# Patient Record
Sex: FEMALE | Race: WHITE | HISPANIC OR LATINO | Employment: OTHER | URBAN - METROPOLITAN AREA
[De-identification: names, ages, dates, MRNs, and addresses within clinical notes are randomized per-mention and may not be internally consistent; named-entity substitution may affect disease eponyms.]

---

## 2017-07-17 ENCOUNTER — APPOINTMENT (OUTPATIENT)
Dept: RADIOLOGY | Facility: MEDICAL CENTER | Age: 72
DRG: 292 | End: 2017-07-17
Attending: EMERGENCY MEDICINE
Payer: MEDICARE

## 2017-07-17 ENCOUNTER — HOSPITAL ENCOUNTER (INPATIENT)
Facility: MEDICAL CENTER | Age: 72
LOS: 3 days | DRG: 292 | End: 2017-07-20
Attending: EMERGENCY MEDICINE | Admitting: INTERNAL MEDICINE
Payer: MEDICARE

## 2017-07-17 ENCOUNTER — RESOLUTE PROFESSIONAL BILLING HOSPITAL PROF FEE (OUTPATIENT)
Dept: HOSPITALIST | Facility: MEDICAL CENTER | Age: 72
End: 2017-07-17
Payer: MEDICARE

## 2017-07-17 DIAGNOSIS — R07.9 CHEST PAIN, UNSPECIFIED TYPE: ICD-10-CM

## 2017-07-17 DIAGNOSIS — I50.31 ACUTE DIASTOLIC CONGESTIVE HEART FAILURE (HCC): ICD-10-CM

## 2017-07-17 PROBLEM — E78.5 DYSLIPIDEMIA: Status: ACTIVE | Noted: 2017-07-17

## 2017-07-17 LAB
ALBUMIN SERPL BCP-MCNC: 3.5 G/DL (ref 3.2–4.9)
ALBUMIN/GLOB SERPL: 1.1 G/DL
ALP SERPL-CCNC: 121 U/L (ref 30–99)
ALT SERPL-CCNC: 14 U/L (ref 2–50)
ANION GAP SERPL CALC-SCNC: 10 MMOL/L (ref 0–11.9)
APTT PPP: 26.9 SEC (ref 24.7–36)
AST SERPL-CCNC: 17 U/L (ref 12–45)
BASOPHILS # BLD AUTO: 0.4 % (ref 0–1.8)
BASOPHILS # BLD: 0.04 K/UL (ref 0–0.12)
BILIRUB SERPL-MCNC: 0.4 MG/DL (ref 0.1–1.5)
BNP SERPL-MCNC: 159 PG/ML (ref 0–100)
BUN SERPL-MCNC: 39 MG/DL (ref 8–22)
CALCIUM SERPL-MCNC: 8.3 MG/DL (ref 8.5–10.5)
CHLORIDE SERPL-SCNC: 98 MMOL/L (ref 96–112)
CO2 SERPL-SCNC: 22 MMOL/L (ref 20–33)
CREAT SERPL-MCNC: 1.51 MG/DL (ref 0.5–1.4)
EKG IMPRESSION: NORMAL
EOSINOPHIL # BLD AUTO: 0.08 K/UL (ref 0–0.51)
EOSINOPHIL NFR BLD: 0.8 % (ref 0–6.9)
ERYTHROCYTE [DISTWIDTH] IN BLOOD BY AUTOMATED COUNT: 37.5 FL (ref 35.9–50)
GFR SERPL CREATININE-BSD FRML MDRD: 34 ML/MIN/1.73 M 2
GLOBULIN SER CALC-MCNC: 3.2 G/DL (ref 1.9–3.5)
GLUCOSE BLD-MCNC: 472 MG/DL (ref 65–99)
GLUCOSE SERPL-MCNC: 313 MG/DL (ref 65–99)
HCT VFR BLD AUTO: 32.7 % (ref 37–47)
HGB BLD-MCNC: 10.7 G/DL (ref 12–16)
IMM GRANULOCYTES # BLD AUTO: 0.03 K/UL (ref 0–0.11)
IMM GRANULOCYTES NFR BLD AUTO: 0.3 % (ref 0–0.9)
INR PPP: 0.93 (ref 0.87–1.13)
LYMPHOCYTES # BLD AUTO: 1.99 K/UL (ref 1–4.8)
LYMPHOCYTES NFR BLD: 19.5 % (ref 22–41)
MCH RBC QN AUTO: 28.1 PG (ref 27–33)
MCHC RBC AUTO-ENTMCNC: 32.7 G/DL (ref 33.6–35)
MCV RBC AUTO: 85.8 FL (ref 81.4–97.8)
MONOCYTES # BLD AUTO: 0.81 K/UL (ref 0–0.85)
MONOCYTES NFR BLD AUTO: 7.9 % (ref 0–13.4)
NEUTROPHILS # BLD AUTO: 7.26 K/UL (ref 2–7.15)
NEUTROPHILS NFR BLD: 71.1 % (ref 44–72)
NRBC # BLD AUTO: 0 K/UL
NRBC BLD AUTO-RTO: 0 /100 WBC
PLATELET # BLD AUTO: 218 K/UL (ref 164–446)
PMV BLD AUTO: 12.9 FL (ref 9–12.9)
POTASSIUM SERPL-SCNC: 3.9 MMOL/L (ref 3.6–5.5)
PROT SERPL-MCNC: 6.7 G/DL (ref 6–8.2)
PROTHROMBIN TIME: 12.7 SEC (ref 12–14.6)
RBC # BLD AUTO: 3.81 M/UL (ref 4.2–5.4)
SODIUM SERPL-SCNC: 130 MMOL/L (ref 135–145)
TROPONIN I SERPL-MCNC: 0.04 NG/ML (ref 0–0.04)
WBC # BLD AUTO: 10.2 K/UL (ref 4.8–10.8)

## 2017-07-17 PROCEDURE — 71010 DX-CHEST-PORTABLE (1 VIEW): CPT

## 2017-07-17 PROCEDURE — 93005 ELECTROCARDIOGRAM TRACING: CPT

## 2017-07-17 PROCEDURE — 96374 THER/PROPH/DIAG INJ IV PUSH: CPT

## 2017-07-17 PROCEDURE — 700102 HCHG RX REV CODE 250 W/ 637 OVERRIDE(OP): Performed by: INTERNAL MEDICINE

## 2017-07-17 PROCEDURE — A9270 NON-COVERED ITEM OR SERVICE: HCPCS | Performed by: INTERNAL MEDICINE

## 2017-07-17 PROCEDURE — 96376 TX/PRO/DX INJ SAME DRUG ADON: CPT

## 2017-07-17 PROCEDURE — 85730 THROMBOPLASTIN TIME PARTIAL: CPT

## 2017-07-17 PROCEDURE — 94760 N-INVAS EAR/PLS OXIMETRY 1: CPT

## 2017-07-17 PROCEDURE — A9270 NON-COVERED ITEM OR SERVICE: HCPCS | Performed by: NURSE PRACTITIONER

## 2017-07-17 PROCEDURE — 85025 COMPLETE CBC W/AUTO DIFF WBC: CPT

## 2017-07-17 PROCEDURE — 99223 1ST HOSP IP/OBS HIGH 75: CPT | Performed by: INTERNAL MEDICINE

## 2017-07-17 PROCEDURE — 80053 COMPREHEN METABOLIC PANEL: CPT

## 2017-07-17 PROCEDURE — 700111 HCHG RX REV CODE 636 W/ 250 OVERRIDE (IP): Performed by: EMERGENCY MEDICINE

## 2017-07-17 PROCEDURE — 770020 HCHG ROOM/CARE - TELE (206)

## 2017-07-17 PROCEDURE — 96372 THER/PROPH/DIAG INJ SC/IM: CPT

## 2017-07-17 PROCEDURE — 82962 GLUCOSE BLOOD TEST: CPT

## 2017-07-17 PROCEDURE — 36415 COLL VENOUS BLD VENIPUNCTURE: CPT

## 2017-07-17 PROCEDURE — 99285 EMERGENCY DEPT VISIT HI MDM: CPT

## 2017-07-17 PROCEDURE — 84484 ASSAY OF TROPONIN QUANT: CPT

## 2017-07-17 PROCEDURE — 83880 ASSAY OF NATRIURETIC PEPTIDE: CPT

## 2017-07-17 PROCEDURE — 700102 HCHG RX REV CODE 250 W/ 637 OVERRIDE(OP): Performed by: NURSE PRACTITIONER

## 2017-07-17 PROCEDURE — 700111 HCHG RX REV CODE 636 W/ 250 OVERRIDE (IP): Performed by: INTERNAL MEDICINE

## 2017-07-17 PROCEDURE — 85610 PROTHROMBIN TIME: CPT

## 2017-07-17 RX ORDER — DEXTROSE MONOHYDRATE 25 G/50ML
25 INJECTION, SOLUTION INTRAVENOUS
Status: DISCONTINUED | OUTPATIENT
Start: 2017-07-17 | End: 2017-07-20 | Stop reason: HOSPADM

## 2017-07-17 RX ORDER — HEPARIN SODIUM 5000 [USP'U]/ML
5000 INJECTION, SOLUTION INTRAVENOUS; SUBCUTANEOUS EVERY 8 HOURS
Status: DISCONTINUED | OUTPATIENT
Start: 2017-07-17 | End: 2017-07-20 | Stop reason: HOSPADM

## 2017-07-17 RX ORDER — INSULIN GLARGINE 100 [IU]/ML
30 INJECTION, SOLUTION SUBCUTANEOUS NIGHTLY
Status: DISCONTINUED | OUTPATIENT
Start: 2017-07-17 | End: 2017-07-20 | Stop reason: HOSPADM

## 2017-07-17 RX ORDER — FUROSEMIDE 10 MG/ML
40 INJECTION INTRAMUSCULAR; INTRAVENOUS ONCE
Status: COMPLETED | OUTPATIENT
Start: 2017-07-17 | End: 2017-07-17

## 2017-07-17 RX ORDER — DOXYCYCLINE 100 MG/1
100 TABLET ORAL EVERY 12 HOURS
Status: DISCONTINUED | OUTPATIENT
Start: 2017-07-17 | End: 2017-07-20 | Stop reason: HOSPADM

## 2017-07-17 RX ORDER — OMEPRAZOLE 20 MG/1
20 CAPSULE, DELAYED RELEASE ORAL DAILY
Status: DISCONTINUED | OUTPATIENT
Start: 2017-07-18 | End: 2017-07-20 | Stop reason: HOSPADM

## 2017-07-17 RX ORDER — GUAIFENESIN 600 MG/1
600 TABLET, EXTENDED RELEASE ORAL EVERY 12 HOURS
Status: DISCONTINUED | OUTPATIENT
Start: 2017-07-17 | End: 2017-07-20 | Stop reason: HOSPADM

## 2017-07-17 RX ORDER — FUROSEMIDE 10 MG/ML
40 INJECTION INTRAMUSCULAR; INTRAVENOUS
Status: DISCONTINUED | OUTPATIENT
Start: 2017-07-17 | End: 2017-07-18

## 2017-07-17 RX ORDER — LABETALOL HYDROCHLORIDE 5 MG/ML
10 INJECTION, SOLUTION INTRAVENOUS EVERY 4 HOURS PRN
Status: DISCONTINUED | OUTPATIENT
Start: 2017-07-17 | End: 2017-07-20 | Stop reason: HOSPADM

## 2017-07-17 RX ORDER — POLYETHYLENE GLYCOL 3350 17 G/17G
1 POWDER, FOR SOLUTION ORAL
Status: DISCONTINUED | OUTPATIENT
Start: 2017-07-17 | End: 2017-07-20 | Stop reason: HOSPADM

## 2017-07-17 RX ORDER — SILDENAFIL CITRATE 20 MG/1
20 TABLET ORAL 2 TIMES DAILY
COMMUNITY

## 2017-07-17 RX ORDER — ACETAMINOPHEN 325 MG/1
650 TABLET ORAL EVERY 4 HOURS PRN
Status: DISCONTINUED | OUTPATIENT
Start: 2017-07-17 | End: 2017-07-20 | Stop reason: HOSPADM

## 2017-07-17 RX ORDER — ATORVASTATIN CALCIUM 40 MG/1
40 TABLET, FILM COATED ORAL NIGHTLY
Status: DISCONTINUED | OUTPATIENT
Start: 2017-07-17 | End: 2017-07-20 | Stop reason: HOSPADM

## 2017-07-17 RX ORDER — LISINOPRIL 40 MG/1
40 TABLET ORAL DAILY
COMMUNITY
End: 2017-07-17

## 2017-07-17 RX ORDER — AMOXICILLIN 250 MG
2 CAPSULE ORAL 2 TIMES DAILY
Status: DISCONTINUED | OUTPATIENT
Start: 2017-07-18 | End: 2017-07-20 | Stop reason: HOSPADM

## 2017-07-17 RX ORDER — ACETAMINOPHEN 650 MG/1
650 SUPPOSITORY RECTAL EVERY 4 HOURS PRN
Status: DISCONTINUED | OUTPATIENT
Start: 2017-07-17 | End: 2017-07-20 | Stop reason: HOSPADM

## 2017-07-17 RX ORDER — POTASSIUM CHLORIDE 20 MEQ/1
10 TABLET, EXTENDED RELEASE ORAL 2 TIMES DAILY
Status: DISCONTINUED | OUTPATIENT
Start: 2017-07-17 | End: 2017-07-19

## 2017-07-17 RX ORDER — ONDANSETRON 4 MG/1
4 TABLET, ORALLY DISINTEGRATING ORAL EVERY 4 HOURS PRN
Status: DISCONTINUED | OUTPATIENT
Start: 2017-07-17 | End: 2017-07-20 | Stop reason: HOSPADM

## 2017-07-17 RX ORDER — SILDENAFIL CITRATE 20 MG/1
20 TABLET ORAL 2 TIMES DAILY
Status: DISCONTINUED | OUTPATIENT
Start: 2017-07-17 | End: 2017-07-20 | Stop reason: HOSPADM

## 2017-07-17 RX ORDER — AMLODIPINE BESYLATE 5 MG/1
5 TABLET ORAL DAILY
Status: DISCONTINUED | OUTPATIENT
Start: 2017-07-18 | End: 2017-07-20 | Stop reason: HOSPADM

## 2017-07-17 RX ORDER — BISACODYL 10 MG
10 SUPPOSITORY, RECTAL RECTAL
Status: DISCONTINUED | OUTPATIENT
Start: 2017-07-17 | End: 2017-07-20 | Stop reason: HOSPADM

## 2017-07-17 RX ORDER — ONDANSETRON 2 MG/ML
4 INJECTION INTRAMUSCULAR; INTRAVENOUS EVERY 4 HOURS PRN
Status: DISCONTINUED | OUTPATIENT
Start: 2017-07-17 | End: 2017-07-20 | Stop reason: HOSPADM

## 2017-07-17 RX ORDER — AMLODIPINE BESYLATE 5 MG/1
5 TABLET ORAL DAILY
COMMUNITY

## 2017-07-17 RX ADMIN — INSULIN GLARGINE 30 UNITS: 100 INJECTION, SOLUTION SUBCUTANEOUS at 20:34

## 2017-07-17 RX ADMIN — HEPARIN SODIUM 5000 UNITS: 5000 INJECTION, SOLUTION INTRAVENOUS; SUBCUTANEOUS at 22:22

## 2017-07-17 RX ADMIN — SILDENAFIL 20 MG: 20 TABLET ORAL at 22:22

## 2017-07-17 RX ADMIN — DOXYCYCLINE 100 MG: 100 TABLET ORAL at 22:22

## 2017-07-17 RX ADMIN — INSULIN LISPRO 9 UNITS: 100 INJECTION, SOLUTION INTRAVENOUS; SUBCUTANEOUS at 20:33

## 2017-07-17 RX ADMIN — GUAIFENESIN 600 MG: 600 TABLET, EXTENDED RELEASE ORAL at 22:22

## 2017-07-17 RX ADMIN — FUROSEMIDE 40 MG: 10 INJECTION, SOLUTION INTRAMUSCULAR; INTRAVENOUS at 19:31

## 2017-07-17 RX ADMIN — POTASSIUM CHLORIDE 10 MEQ: 1500 TABLET, EXTENDED RELEASE ORAL at 22:22

## 2017-07-17 RX ADMIN — FUROSEMIDE 40 MG: 10 INJECTION, SOLUTION INTRAMUSCULAR; INTRAVENOUS at 13:41

## 2017-07-17 RX ADMIN — ACETAMINOPHEN 650 MG: 325 TABLET, FILM COATED ORAL at 22:22

## 2017-07-17 RX ADMIN — ATORVASTATIN CALCIUM 40 MG: 40 TABLET, FILM COATED ORAL at 22:22

## 2017-07-17 ASSESSMENT — ENCOUNTER SYMPTOMS
DEPRESSION: 0
MUSCULOSKELETAL NEGATIVE: 1
HEADACHES: 0
SHORTNESS OF BREATH: 1
GASTROINTESTINAL NEGATIVE: 1
FEVER: 0
COUGH: 1
MYALGIAS: 0
WEAKNESS: 0
PSYCHIATRIC NEGATIVE: 1
NEUROLOGICAL NEGATIVE: 1
EYES NEGATIVE: 1
SPUTUM PRODUCTION: 0
BRUISES/BLEEDS EASILY: 0
DIZZINESS: 0
WHEEZING: 0
BLURRED VISION: 0
ABDOMINAL PAIN: 0

## 2017-07-17 ASSESSMENT — COGNITIVE AND FUNCTIONAL STATUS - GENERAL
WALKING IN HOSPITAL ROOM: A LITTLE
SUGGESTED CMS G CODE MODIFIER DAILY ACTIVITY: CJ
DRESSING REGULAR LOWER BODY CLOTHING: A LITTLE
HELP NEEDED FOR BATHING: A LITTLE
SUGGESTED CMS G CODE MODIFIER MOBILITY: CJ
CLIMB 3 TO 5 STEPS WITH RAILING: A LITTLE
MOBILITY SCORE: 22
DAILY ACTIVITIY SCORE: 22

## 2017-07-17 ASSESSMENT — LIFESTYLE VARIABLES
DO YOU DRINK ALCOHOL: NO
EVER_SMOKED: YES

## 2017-07-17 ASSESSMENT — PAIN SCALES - GENERAL
PAINLEVEL_OUTOF10: 8
PAINLEVEL_OUTOF10: 0
PAINLEVEL_OUTOF10: 0
PAINLEVEL_OUTOF10: 2

## 2017-07-17 ASSESSMENT — PATIENT HEALTH QUESTIONNAIRE - PHQ9
SUM OF ALL RESPONSES TO PHQ QUESTIONS 1-9: 0
2. FEELING DOWN, DEPRESSED, IRRITABLE, OR HOPELESS: NOT AT ALL
SUM OF ALL RESPONSES TO PHQ9 QUESTIONS 1 AND 2: 0
1. LITTLE INTEREST OR PLEASURE IN DOING THINGS: NOT AT ALL

## 2017-07-17 NOTE — IP AVS SNAPSHOT
" <p align=\"LEFT\"><IMG SRC=\"//[email or web address]$/[email or web address]\" alt=\"Image\" WIDTH=\"50%\" HEIGHT=\"200\" BORDER=\"\"></p>                   Name:Joanne Yepez  Medical Record Number:[id number]  CSN: [id number]    YOB: 1945   Age: 72 y.o.  Sex: female  HT:1.549 m (5' 1\") WT: 75.2 kg (165 lb 12.6 oz)          Admit Date: 7/17/2017     Discharge Date:   Today's Date: 7/20/2017  Attending Doctor:  Myron Singh M.D.                  Allergies:  Review of patient's allergies indicates no known allergies.          Your appointments     Jul 21, 2017 10:00 AM   CARE MANAGER TELEPHONE VISIT with CARE MANAGER   [address]  [place] [address] [phone]           ***IMPORTANT**** This is a phone visit only. Do not come into the office. The Care Manager will call you at the scheduled time for Care Manager Telephone Visit.            Jul 24, 2017  2:20 PM   Established Patient with FADY Yepez   [address]  [place] [address] [phone]           You will be receiving a confirmation call a few days before your appointment from our automated call confirmation system.            Aug 15, 2017 10:15 AM   Heart Failure New with Huong Jones M.D.   [facility] (--)    [address], [address]  [place] [id number] [phone]                 Medication List      Take these Medications        Instructions    acetaminophen 325 MG Tabs   Commonly known as:  TYLENOL    Take 2 Tabs by mouth every four hours as needed for Fever (Temperature greater than 101.5 F).   Dose:  650 mg       amlodipine 5 MG Tabs   Commonly known as:  NORVASC    Take 5 mg by mouth every day.   Dose:  5 mg       aspirin EC 81 MG Tbec   Commonly known as:  ECOTRIN    Take 81 mg by mouth every day.   Dose:  81 mg       atorvastatin 40 MG Tabs   Commonly known as:  LIPITOR    Take 40 mg " by mouth every evening.   Dose:  40 mg       doxycycline monohydrate 100 MG tablet   Commonly known as:  ADOXA    Take 1 Tab by mouth every 12 hours for 1 day.   Dose:  100 mg       furosemide 40 MG Tabs   Commonly known as:  LASIX    Take 1 Tab by mouth every day.   Dose:  40 mg       * guaiFENesin 100 MG/5ML Soln   Commonly known as:  ROBITUSSIN    Take 10 mL by mouth every four hours as needed for Cough.   Dose:  10 mL       * guaifenesin  MG Tb12   Commonly known as:  MUCINEX    Take 1 Tab by mouth every 12 hours.   Dose:  600 mg       insulin detemir 100 UNIT/ML Soln   What changed:  when to take this   Commonly known as:  LEVEMIR    Inject 30 Units as instructed every evening.   Dose:  30 Units       insulin lispro 100 UNIT/ML Soln   Commonly known as:  HUMALOG    Inject 5 Units as instructed 3 times a day before meals.   Dose:  5 Units       metoprolol 25 MG Tabs   Commonly known as:  LOPRESSOR    Take 25 mg by mouth 2 times a day.   Dose:  25 mg       omeprazole 20 MG delayed-release capsule   Commonly known as:  PRILOSEC    Take 1 Cap by mouth every day.   Dose:  20 mg       potassium chloride SA 10 MEQ Tbcr   Commonly known as:  K-DUR    Take 1 Tab by mouth every day.   Dose:  10 mEq       senna-docusate 8.6-50 MG Tabs   Commonly known as:  PERICOLACE or SENOKOT S    Take 2 Tabs by mouth 2 Times a Day.   Dose:  2 Tab       sildenafil 20 MG tablet   Commonly known as:  REVATIO    Take 20 mg by mouth 2 Times a Day.   Dose:  20 mg       * Notice:  This list has 2 medication(s) that are the same as other medications prescribed for you. Read the directions carefully, and ask your doctor or other care provider to review them with you.

## 2017-07-17 NOTE — ED NOTES
Med rec complete per Pt's Rx bottles at bedside  Rx bottles returned to Pt's purse  Allergies reviewed

## 2017-07-17 NOTE — ED NOTES
Chief Complaint   Patient presents with   • Shortness of Breath     BIB EMS for SOB with exertion x 2 days. BLE pitting edema noted.      Pt Polish speaking only.   Per EMS pt was 88% with exertion. Resting on RA in bed at 96%.  Chart up for ERP.

## 2017-07-17 NOTE — IP AVS SNAPSHOT
7/20/2017    Joanne Yepez  [address] [address]    Dear Joanne:    [facility] wants to ensure your discharge home is safe and you or your loved ones have had all of your questions answered regarding your care after you leave the hospital.    Below is a list of resources and contact information should you have any questions regarding your hospital stay, follow-up instructions, or active medical symptoms.    Questions or Concerns Regarding… Contact   Medical Questions Related to Your Discharge  (7 days a week, 8am-5pm) Contact a Nurse Care Coordinator   [phone]   Medical Questions Not Related to Your Discharge  (24 hours a day / 7 days a week)  Contact the Nurse Health Line   [phone]    Medications or Discharge Instructions Refer to your discharge packet   or contact your [facility] Primary Care Provider   [phone]   Follow-up Appointment(s) Schedule your appointment via Qik   or contact Scheduling [phone]   Billing Review your statement via Qik  or contact Billing [phone]   Medical Records Review your records via Qik   or contact Medical Records [phone]     You may receive a telephone call within two days of discharge. This call is to make certain you understand your discharge instructions and have the opportunity to have any questions answered. You can also easily access your medical information, test results and upcoming appointments via the Qik free online health management tool. You can learn more and sign up at ZenDay/Qik. For assistance setting up your Qik account, please call [phone].    Once again, we want to ensure your discharge home is safe and that you have a clear understanding of any next steps in your care. If you have any questions or concerns, please do not hesitate to contact us, we are here for you. Thank you for choosing [facility] for your healthcare needs.    Sincerely,    Your [facility]  Team

## 2017-07-17 NOTE — IP AVS SNAPSHOT
" Home Care Instructions                                                                                                                  Name:Joanne Yepez  Medical Record Number:[id number]  CSN: [id number]    YOB: 1945   Age: 72 y.o.  Sex: female  HT:1.549 m (5' 1\") WT: 75.2 kg (165 lb 12.6 oz)          Admit Date: 7/17/2017     Discharge Date:   Today's Date: 7/20/2017  Attending Doctor:  Myron Singh M.D.                  Allergies:  Review of patient's allergies indicates no known allergies.            Discharge Instructions       Discharge Instructions    Discharged to home by car with relative. Discharged via wheelchair, hospital escort: Yes.  Special equipment needed: Not Applicable    Be sure to schedule a follow-up appointment with your primary care doctor or any specialists as instructed.     Discharge Plan:   Influenza Vaccine Indication: Patient Refuses (outside flu season)    I understand that a diet low in cholesterol, fat, and sodium is recommended for good health. Unless I have been given specific instructions below for another diet, I accept this instruction as my diet prescription.   Other diet: Renal/Diabetic    Special Instructions: Follow up with her primary provider at [place] in 2 weeks. Defer further workup for chronic R sided axillary pain.  Follow up with Heart Failure appointment July 24th        HF Patient Discharge Instructions  · Monitor your weight daily, and maintain a weight chart, to track your weight changes.   · Activity as tolerated, unless your Doctor has ordered otherwise. Other activity order: as tolerated.  · Follow a low fat, low cholesterol, low salt diet unless instructed otherwise by your Doctor. Read the labels on the back of food products and track your intake of fat, cholesterol and salt.   · Fluid Restriction Yes. If a Fluid Restriction has been ordered by your Doctor, measure fluids with a measuring cup to ensure that you are not " exceeding the restriction.   · No smoking.  · Oxygen No. If your Doctor has ordered that you wear Oxygen at home, it is important to wear it as ordered.  · Did you receive an explanation from staff on the importance of taking each of your medications and why it is necessary to keep taking them unless your doctor says to stop? Yes  · Were all of your questions answered about how to manage your heart failure and what to do if you have increased signs and symptoms after you go home? Yes  · Do you feel like your heart failure care team involved you in the care treatment plan and allowed you to make decisions regarding your care while in the hospital and addressed any discharge needs you might have? Yes    See the educational handout provided at discharge for more information on monitoring your daily weight, activity and diet. This also explains more about Heart Failure, symptoms of a flare-up and some of the tests that you have undergone.     Warning Signs of a Flare-Up include:  · Swelling in the ankles or lower legs.  · Shortness of breath, while at rest, or while doing normal activities.   · Shortness of breath at night when in bed, or coughing in bed.   · Requiring more pillows to sleep at night, or needing to sit up at night to sleep.  · Feeling weak, dizzy or fatigued.     When to call your Doctor:  · Call [facility] seven days a week from 8:00 a.m. to 8:00 p.m. for medical questions [phone].  · Call your Primary Care Physician or Cardiologist if:   1. You experience any pain radiating to your jaw or neck.  2. You have any difficulty breathing.  3. You experience weight gain of 3 lbs in a day or 5 lbs in a week.   4. You feel any palpitations or irregular heartbeats.  5. You become dizzy or lose consciousness.   If you have had an angiogram or had a pacemaker or AICD placed, and experience:  1. Bleeding, drainage or swelling at the surgical / puncture site.  2. Fever greater than 100.0  F  3. Shock from internal defibrillator.  4. Cool and / or numb extremities.      · Is patient discharged on Warfarin / Coumadin?   No     · Is patient Post Blood Transfusion?  No    Depression / Suicide Risk    As you are discharged from this [facility], it is important to learn how to keep safe from harming yourself.    Recognize the warning signs:  · Abrupt changes in personality, positive or negative- including increase in energy   · Giving away possessions  · Change in eating patterns- significant weight changes-  positive or negative  · Change in sleeping patterns- unable to sleep or sleeping all the time   · Unwillingness or inability to communicate  · Depression  · Unusual sadness, discouragement and loneliness  · Talk of wanting to die  · Neglect of personal appearance   · Rebelliousness- reckless behavior  · Withdrawal from people/activities they love  · Confusion- inability to concentrate     If you or a loved one observes any of these behaviors or has concerns about self-harm, here's what you can do:  · Talk about it- your feelings and reasons for harming yourself  · Remove any means that you might use to hurt yourself (examples: pills, rope, extension cords, firearm)  · Get professional help from the community (Mental Health, Substance Abuse, psychological counseling)  · Do not be alone:Call your Safe Contact- someone whom you trust who will be there for you.  · Call your local CRISIS HOTLINE 521-2804 or 833-474-8955  · Call your local Children's Mobile Crisis Response Team [place] (587) 032-7963 or www.FiveStars  · Call the toll free National Suicide Prevention Hotlines   · National Suicide Prevention Lifeline 106-853-BSCR (3356)  · National Hope Line Network 800-SUICIDE (405-4993)        Your appointments     Jul 21, 2017 10:00 AM   CARE MANAGER TELEPHONE VISIT with CARE MANAGER   [facility] ([facility] 2[facility]    [address] [place] [address] [address]  [place] [address] [phone]            ***IMPORTANT**** This is a phone visit only. Do not come into the office. The Care Manager will call you at the scheduled time for Care Manager Telephone Visit.            Jul 24, 2017  2:20 PM   Established Patient with FADY Yepez   [facility]    [address]  [place] [address]   [phone]           You will be receiving a confirmation call a few days before your appointment from our automated call confirmation system.            Aug 15, 2017 10:15 AM   Heart Failure New with Huong Jones M.D.   [facility] (--)    [address], [address]  [place] [id number] [phone]                 Discharge Medication Instructions:    Below are the medications your physician expects you to take upon discharge:    Review all your home medications and newly ordered medications with your doctor and/or pharmacist. Follow medication instructions as directed by your doctor and/or pharmacist.    Please keep your medication list with you and share with your physician.               Medication List      START taking these medications        Instructions    Morning Afternoon Evening Bedtime    acetaminophen 325 MG Tabs   Last time this was given:  650 mg on 7/19/2017  9:43 PM   Commonly known as:  TYLENOL   Next Dose Due:  As needed        Take 2 Tabs by mouth every four hours as needed for Fever (Temperature greater than 101.5 F).   Dose:  650 mg                        doxycycline monohydrate 100 MG tablet   Last time this was given:  100 mg on 7/20/2017  9:00 AM   Commonly known as:  ADOXA   Next Dose Due:  9 PM        Take 1 Tab by mouth every 12 hours for 1 day.   Dose:  100 mg                        * guaiFENesin 100 MG/5ML Soln   Commonly known as:  ROBITUSSIN   Next Dose Due:  As needed        Take 10 mL by mouth every four hours as needed for Cough.   Dose:  10 mL                        * guaifenesin  MG Tb12    Last time this was given:  600 mg on 7/20/2017  9:00 AM   Commonly known as:  MUCINEX   Next Dose Due:  9 PM        Take 1 Tab by mouth every 12 hours.   Dose:  600 mg                        senna-docusate 8.6-50 MG Tabs   Last time this was given:  2 Tabs on 7/20/2017  9:01 AM   Commonly known as:  PERICOLACE or SENOKOT S   Next Dose Due:  Tonight          Take 2 Tabs by mouth 2 Times a Day.   Dose:  2 Tab                        * Notice:  This list has 2 medication(s) that are the same as other medications prescribed for you. Read the directions carefully, and ask your doctor or other care provider to review them with you.      CHANGE how you take these medications        Instructions    Morning Afternoon Evening Bedtime    insulin detemir 100 UNIT/ML Soln   What changed:  when to take this   Commonly known as:  LEVEMIR   Next Dose Due:  tonight        Inject 30 Units as instructed every evening.   Dose:  30 Units                          CONTINUE taking these medications        Instructions    Morning Afternoon Evening Bedtime    amlodipine 5 MG Tabs   Last time this was given:  5 mg on 7/20/2017  9:00 AM   Commonly known as:  NORVASC   Next Dose Due:  Tonight          Take 5 mg by mouth every day.   Dose:  5 mg                        aspirin EC 81 MG Tbec   Last time this was given:  325 mg on 7/20/2017  9:00 AM   Commonly known as:  ECOTRIN   Next Dose Due:  Tomorrow Morning          Take 81 mg by mouth every day.   Dose:  81 mg                        atorvastatin 40 MG Tabs   Last time this was given:  40 mg on 7/19/2017  9:35 PM   Commonly known as:  LIPITOR   Next Dose Due:  Tonight          Take 40 mg by mouth every evening.   Dose:  40 mg                        furosemide 40 MG Tabs   Commonly known as:  LASIX   Next Dose Due:  Resume schedule        Take 1 Tab by mouth every day.   Dose:  40 mg                        insulin lispro 100 UNIT/ML Soln   Last time this was given:  2 Units on 7/20/2017 11:12  AM   Commonly known as:  HUMALOG   Next Dose Due:  Check sugar level before every meal        Inject 5 Units as instructed 3 times a day before meals.   Dose:  5 Units                        metoprolol 25 MG Tabs   Last time this was given:  25 mg on 7/20/2017  9:00 AM   Commonly known as:  LOPRESSOR   Next Dose Due:  Tonight          Take 25 mg by mouth 2 times a day.   Dose:  25 mg                        omeprazole 20 MG delayed-release capsule   Last time this was given:  20 mg on 7/20/2017  9:00 AM   Commonly known as:  PRILOSEC   Next Dose Due:  Tomorrow Morning            Take 1 Cap by mouth every day.   Dose:  20 mg                        potassium chloride SA 10 MEQ Tbcr   Last time this was given:  10 mEq on 7/18/2017  9:16 PM   Commonly known as:  K-DUR   Next Dose Due:  Tomorrow Morning          Take 1 Tab by mouth every day.   Dose:  10 mEq                        sildenafil 20 MG tablet   Last time this was given:  20 mg on 7/20/2017  9:00 AM   Commonly known as:  REVATIO   Next Dose Due:  Tonight            Take 20 mg by mouth 2 Times a Day.   Dose:  20 mg                          STOP taking these medications     insulin glargine 100 UNIT/ML Soln   Commonly known as:  LANTUS                    Where to Get Your Medications      Information about where to get these medications is not yet available     ! Ask your nurse or doctor about these medications    - doxycycline monohydrate 100 MG tablet  - insulin detemir 100 UNIT/ML Soln  - omeprazole 20 MG delayed-release capsule  - senna-docusate 8.6-50 MG Tabs            Instructions           Diet / Nutrition:    Follow any diet instructions given to you by your doctor or the dietician, including how much salt (sodium) you are allowed each day.    If you are overweight, talk to your doctor about a weight reduction plan.    Activity:    Remain physically active following your doctor's instructions about exercise and activity.    Rest often.     Any time you  become even a little tired or short of breath, SIT DOWN and rest.    Worsening Symptoms:    Report any of the following signs and symptoms to the doctor's office immediately:    *Pain of jaw, arm, or neck  *Chest pain not relieved by medication                               *Dizziness or loss of consciousness  *Difficulty breathing even when at rest   *More tired than usual                                       *Bleeding drainage or swelling of surgical site  *Swelling of feet, ankles, legs or stomach                 *Fever (>100ºF)  *Pink or blood tinged sputum  *Weight gain (3lbs/day or 5lbs /week)           *Shock from internal defibrillator (if applicable)  *Palpitations or irregular heartbeats                *Cool and/or numb extremities    Stroke Awareness    Common Risk Factors for Stroke include:    Age  Atrial Fibrillation  Carotid Artery Stenosis  Diabetes Mellitus  Excessive alcohol consumption  High blood pressure  Overweight   Physical inactivity  Smoking    Warning signs and symptoms of a stroke include:    *Sudden numbness or weakness of the face, arm or leg (especially on one side of the body).  *Sudden confusion, trouble speaking or understanding.  *Sudden trouble seeing in one or both eyes.  *Sudden trouble walking, dizziness, loss of balance or coordination.Sudden severe headache with no known cause.    It is very important to get treatment quickly when a stroke occurs. If you experience any of the above warning signs, call 911 immediately.                   Disclaimer         Quit Smoking / Tobacco Use:    I understand the use of any tobacco products increases my chance of suffering from future heart disease or stroke and could cause other illnesses which may shorten my life. Quitting the use of tobacco products is the single most important thing I can do to improve my health. For further information on smoking / tobacco cessation call a Toll Free Quit Line at 1-221.903.7573 (*National Cancer  [place]) or 1-416.606.9556 (American Lung Association) or you can access the web based program at www.lungusa.org.    [place] Tobacco Users Help Line:  (917) 913-4704       Toll Free: 1-964.989.6027  Quit Tobacco Program [facility] (663)321-1207    Crisis Hotline:    [place] Crisis Hotline:  6-213-HCSZOJK or 1-734.560.4801    [place] Crisis Hotline:    1-730.669.7731 or 601-347-0815    Discharge Survey:   Thank you for choosing [facility]. We hope we did everything we could to make your hospital stay a pleasant one. You may be receiving a phone survey and we would appreciate your time and participation in answering the questions. Your input is very valuable to us in our efforts to improve our service to our patients and their families.        My signature on this form indicates that:    1. I have reviewed and understand the above information.  2. My questions regarding this information have been answered to my satisfaction.  3. I have formulated a plan with my discharge nurse to obtain my prescribed medications for home.                  Disclaimer         __________________________________                     __________       ________                       Patient Signature                                                 Date                    Time

## 2017-07-17 NOTE — ED PROVIDER NOTES
ED Provider Note    Scribed for Felix Arauz M.D. by Kev Cox. 7/17/2017  9:22 AM    Primary care provider: Tj Baig M.D.  Means of arrival: Ambulance  History obtained from: michaela  History limited by: none    CHIEF COMPLAINT  Chief Complaint   Patient presents with   • Shortness of Breath     BIB EMS for SOB with exertion x 2 days. BLE pitting edema noted.        HPI  Joanne Yepez is a 72 y.o. female who presents to the Emergency Department complaining of shortness of breath, onset last night, with associated chest pain, which radiates to her left shoulder. She adds her throat feels as though it is closing. Laying flat exacerbates her complaint. She denies fever, abdominal pain. Patient is taking Lasix 2 times per day.     REVIEW OF SYSTEMS  Pertinent positives include shortness of breath, chest pain, orthopnea, feeling her throat is closed. Pertinent negatives include no fever, abdominal pain.  All other systems reviewed and negative.C    PAST MEDICAL HISTORY   has a past medical history of Diabetes; Hypertension; Pneumonia (2008); MI (myocardial infarction) (CMS-[facility]); and Renal disorder.    SURGICAL HISTORY   has past surgical history that includes hip nailing intramedullary (11/20/07); hip nailing intramedullary (11/20/07); hysterectomy robotic (10/2/08); other orthopedic surgery (2007); gyn surgery; and other abdominal surgery.    SOCIAL HISTORY  Social History   Substance Use Topics   • Smoking status: Former Smoker     Types: Cigarettes     Start date: 04/12/1974     Quit date: 04/12/2006   • Smokeless tobacco: Never Used   • Alcohol Use: No      History   Drug Use No       FAMILY HISTORY  Family History   Problem Relation Age of Onset   • Diabetes Mother        CURRENT MEDICATIONS  Home Medications     Reviewed by Deo Amaya (Pharmacy Tech) on 07/17/17 at 1328  Med List Status: Complete    Medication Last Dose Status    amlodipine (NORVASC) 5 MG Tab  7/17/2017 Active    aspirin EC (ECOTRIN) 81 MG Tablet Delayed Response 7/17/2017 Active    atorvastatin (LIPITOR) 40 MG Tab 7/16/2017 Active    furosemide (LASIX) 40 MG Tab 7/17/2017 Active    insulin glargine (LANTUS) 100 UNIT/ML SOLN 7/16/2017 Active    insulin lispro (HUMALOG) 100 UNIT/ML Solution 7/17/2017 Active    metoprolol (LOPRESSOR) 25 MG Tab 7/17/2017 Active    omeprazole (PRILOSEC) 20 MG delayed-release capsule 7/17/2017 Active    potassium chloride SA (K-DUR) 10 MEQ Tab CR 7/17/2017 Active    sildenafil (REVATIO) 20 MG tablet 7/17/2017 Active                ALLERGIES  No Known Allergies    PHYSICAL EXAM  VITAL SIGNS: /51 mmHg  Pulse 83  Temp(Src) 36.7 °C (98 °F)  Resp 18  Wt 74 kg (163 lb 2.3 oz)  SpO2 96%    Constitutional: Well developed, Well nourished, mild distress, Non-toxic appearance.   HENT: Normocephalic, Atraumatic, Bilateral external ears normal, Oropharynx moist, No oral exudates.   Eyes: PERRLA, EOMI, Conjunctiva normal, No discharge.   Neck: No tenderness, Supple, No stridor.   Lymphatic: No lymphadenopathy noted.   Cardiovascular: Normal heart rate, Normal rhythm.   Thorax & Lungs: Crackles in bilateral bases, right greater than left. No wheezing.   Abdomen: Soft, No tenderness, No masses, No pulsatile masses.   Skin: Warm, Dry, No erythema, No rash.   Extremities:, bilateral lower extremity 1+pitting edema. No cyanosis.   Musculoskeletal: No tenderness to palpation or major deformities noted.  Intact distal pulses  Neurologic: Awake, alert. Moves all extremities spontaneously.  Psychiatric: Affect normal, Judgment normal, Mood normal.     LABS  Results for orders placed or performed during the hospital encounter of 07/17/17   Complete Metabolic Panel (CMP)   Result Value Ref Range    Sodium 130 (L) 135 - 145 mmol/L    Potassium 3.9 3.6 - 5.5 mmol/L    Chloride 98 96 - 112 mmol/L    Co2 22 20 - 33 mmol/L    Anion Gap 10.0 0.0 - 11.9    Glucose 313 (H) 65 - 99 mg/dL    Bun 39  (H) 8 - 22 mg/dL    Creatinine 1.51 (H) 0.50 - 1.40 mg/dL    Calcium 8.3 (L) 8.5 - 10.5 mg/dL    AST(SGOT) 17 12 - 45 U/L    ALT(SGPT) 14 2 - 50 U/L    Alkaline Phosphatase 121 (H) 30 - 99 U/L    Total Bilirubin 0.4 0.1 - 1.5 mg/dL    Albumin 3.5 3.2 - 4.9 g/dL    Total Protein 6.7 6.0 - 8.2 g/dL    Globulin 3.2 1.9 - 3.5 g/dL    A-G Ratio 1.1 g/dL   Troponin STAT   Result Value Ref Range    Troponin I 0.04 0.00 - 0.04 ng/mL   PT/INR   Result Value Ref Range    PT 12.7 12.0 - 14.6 sec    INR 0.93 0.87 - 1.13   CBC WITH DIFFERENTIAL   Result Value Ref Range    WBC 10.2 4.8 - 10.8 K/uL    RBC 3.81 (L) 4.20 - 5.40 M/uL    Hemoglobin 10.7 (L) 12.0 - 16.0 g/dL    Hematocrit 32.7 (L) 37.0 - 47.0 %    MCV 85.8 81.4 - 97.8 fL    MCH 28.1 27.0 - 33.0 pg    MCHC 32.7 (L) 33.6 - 35.0 g/dL    RDW 37.5 35.9 - 50.0 fL    Platelet Count 218 164 - 446 K/uL    MPV 12.9 9.0 - 12.9 fL    Neutrophils-Polys 71.10 44.00 - 72.00 %    Lymphocytes 19.50 (L) 22.00 - 41.00 %    Monocytes 7.90 0.00 - 13.40 %    Eosinophils 0.80 0.00 - 6.90 %    Basophils 0.40 0.00 - 1.80 %    Immature Granulocytes 0.30 0.00 - 0.90 %    Nucleated RBC 0.00 /100 WBC    Neutrophils (Absolute) 7.26 (H) 2.00 - 7.15 K/uL    Lymphs (Absolute) 1.99 1.00 - 4.80 K/uL    Monos (Absolute) 0.81 0.00 - 0.85 K/uL    Eos (Absolute) 0.08 0.00 - 0.51 K/uL    Baso (Absolute) 0.04 0.00 - 0.12 K/uL    Immature Granulocytes (abs) 0.03 0.00 - 0.11 K/uL    NRBC (Absolute) 0.00 K/uL   BTYPE NATRIURETIC PEPTIDE   Result Value Ref Range    B Natriuretic Peptide 159 (H) 0 - 100 pg/mL   ESTIMATED GFR   Result Value Ref Range    GFR If  41 (A) >60 mL/min/1.73 m 2    GFR If Non  34 (A) >60 mL/min/1.73 m 2   APTT   Result Value Ref Range    APTT 26.9 24.7 - 36.0 sec   EKG (ER)   Result Value Ref Range    Report       [facility] Emergency Dept.    Test Date:  2017-07-17  Pt Name:    BRO WHALEY          Department: ER  MRN:         [id number]                      Room:       [facility]  Gender:     F                            Technician: [id number]  :        [id number]                   Requested By:ER TRIAGE PROTOCOL  Order #:    [id number]                    Reading MD: JOSH BLANCA MD    Measurements  Intervals                                Axis  Rate:       81                           P:          31  HI:         164                          QRS:        -10  QRSD:       134                          T:          154  QT:         440  QTc:        511    Interpretive Statements  SINUS RHYTHM  LEFT BUNDLE BRANCH BLOCK  Compared to ECG 2016 06:13:10  Sinus tachycardia no longer present  Ventricular premature complex(es) no longer present    Electronically Signed On 2017 9:18:27 PDT by JOSH BLANCA MD     All labs reviewed by me.    RADIOLOGY  DX-CHEST-PORTABLE (1 VIEW)   Final Result         1.  There is mild cardiomegaly.      2.  Mild poorly defined perihilar congestive changes are noted which could be due to edema or inflammation.      3.  No consolidations identified.      The radiologist's interpretation of all radiological studies have been reviewed by me.    COURSE & MEDICAL DECISION MAKING  Pertinent Labs & Imaging studies reviewed. (See chart for details)    I reviewed the patient's medical records which showed history of diabetes mellitus, myocardial infarction. Admitted in April for COPD exacerbation. Echo done in April shows EF 55%, related to dialstolic dysfunction.     9:22 AM - Patient seen and examined at bedside. Patient will be treated with Lasix. Ordered DX chest, PT/INR, APTT, CBC with differential, CMP, Troponin, EKG to evaluate her symptoms. The differential diagnoses include but are not limited to: congestive heart failure, pneumonia.       Decision Making:  Patient with chest pain, shortness of breath, increased CHF, pulmonary edema, increased BNP. Given the patient some IV Lasix, discussed the case with  Dr. Escudero for admission to the hospital.    DISPOSITION:  Patient will be admitted to Dr. Escudero in guarded condition.      FINAL IMPRESSION  1. Acute diastolic congestive heart failure (CMS-HCC)    2. Chest pain, unspecified type          I, Kev Cox (Scribe), am scribing for, and in the presence of, Felix Arauz M.D..    Electronically signed by: Kev Cox (Scribe), 7/17/2017    I, Felix Arauz M.D. personally performed the services described in this documentation, as scribed by Kev Cox in my presence, and it is both accurate and complete.    The note accurately reflects work and decisions made by me.  Felix Arauz  7/17/2017  3:24 PM

## 2017-07-17 NOTE — IP AVS SNAPSHOT
CircleBuilder Access Code: [email or web address]  Expires: 8/19/2017  3:30 PM    Your email address is not on file at [email or web address].  Email Addresses are required for you to sign up for CircleBuilder, please contact [phone] to verify your personal information and to provide your email address prior to attempting to register for CircleBuilder.    Joanne Yepez  [address] [place] [address]    CircleBuilder  A secure, online tool to manage your health information     [email or web address]’s CircleBuilder® is a secure, online tool that connects you to your personalized health information from the privacy of your home -- day or night - making it very easy for you to manage your healthcare. Once the activation process is completed, you can even access your medical information using the CircleBuilder andrea, which is available for free in the Apple Andrea store or Google Play store.     To learn more about CircleBuilder, visit [email or web address]    There are two levels of access available (as shown below):   My Chart Features  [facility] Primary Care Doctor [facility]  Specialists [facility]  Urgent  Care Non-[facility] Primary Care Doctor   Email your healthcare team securely and privately 24/7 X X X    Manage appointments: schedule your next appointment; view details of past/upcoming appointments X      Request prescription refills. X      View recent personal medical records, including lab and immunizations X X X X   View health record, including health history, allergies, medications X X X X   Read reports about your outpatient visits, procedures, consult and ER notes X X X X   See your discharge summary, which is a recap of your hospital and/or ER visit that includes your diagnosis, lab results, and care plan X X  X     How to register for CircleBuilder:  Once your e-mail address has been verified, follow the following steps to sign up for Traityt.     1. Go to  [email or web address]  2. Click on the Sign Up Now box, which takes you to the New Member  Sign Up page. You will need to provide the following information:  a. Enter your Effektif Access Code exactly as it appears at the top of this page. (You will not need to use this code after you’ve completed the sign-up process. If you do not sign up before the expiration date, you must request a new code.)   b. Enter your date of birth.   c. Enter your home email address.   d. Click Submit, and follow the next screen’s instructions.  3. Create a Effektif ID. This will be your Effektif login ID and cannot be changed, so think of one that is secure and easy to remember.  4. Create a Effektif password. You can change your password at any time.  5. Enter your Password Reset Question and Answer. This can be used at a later time if you forget your password.   6. Enter your e-mail address. This allows you to receive e-mail notifications when new information is available in Effektif.  7. Click Sign Up. You can now view your health information.    For assistance activating your Effektif account, call [phone]

## 2017-07-18 LAB
ANION GAP SERPL CALC-SCNC: 8 MMOL/L (ref 0–11.9)
BUN SERPL-MCNC: 40 MG/DL (ref 8–22)
CALCIUM SERPL-MCNC: 8.2 MG/DL (ref 8.5–10.5)
CHLORIDE SERPL-SCNC: 102 MMOL/L (ref 96–112)
CO2 SERPL-SCNC: 26 MMOL/L (ref 20–33)
CREAT SERPL-MCNC: 1.7 MG/DL (ref 0.5–1.4)
ERYTHROCYTE [DISTWIDTH] IN BLOOD BY AUTOMATED COUNT: 37.6 FL (ref 35.9–50)
GFR SERPL CREATININE-BSD FRML MDRD: 29 ML/MIN/1.73 M 2
GLUCOSE BLD-MCNC: 154 MG/DL (ref 65–99)
GLUCOSE BLD-MCNC: 195 MG/DL (ref 65–99)
GLUCOSE BLD-MCNC: 271 MG/DL (ref 65–99)
GLUCOSE BLD-MCNC: 300 MG/DL (ref 65–99)
GLUCOSE BLD-MCNC: 359 MG/DL (ref 65–99)
GLUCOSE SERPL-MCNC: 236 MG/DL (ref 65–99)
HCT VFR BLD AUTO: 33.9 % (ref 37–47)
HGB BLD-MCNC: 11 G/DL (ref 12–16)
LV EJECT FRACT MOD 2C 99903: 40.19
LV EJECT FRACT MOD 4C 99902: 71.9
LV EJECT FRACT MOD BP 99901: 64.21
MCH RBC QN AUTO: 27.7 PG (ref 27–33)
MCHC RBC AUTO-ENTMCNC: 32.4 G/DL (ref 33.6–35)
MCV RBC AUTO: 85.4 FL (ref 81.4–97.8)
PLATELET # BLD AUTO: 221 K/UL (ref 164–446)
PMV BLD AUTO: 12.9 FL (ref 9–12.9)
POTASSIUM SERPL-SCNC: 4 MMOL/L (ref 3.6–5.5)
RBC # BLD AUTO: 3.97 M/UL (ref 4.2–5.4)
SODIUM SERPL-SCNC: 136 MMOL/L (ref 135–145)
TSH SERPL DL<=0.005 MIU/L-ACNC: 3.32 UIU/ML (ref 0.3–3.7)
WBC # BLD AUTO: 9.9 K/UL (ref 4.8–10.8)

## 2017-07-18 PROCEDURE — 93306 TTE W/DOPPLER COMPLETE: CPT | Mod: 26 | Performed by: INTERNAL MEDICINE

## 2017-07-18 PROCEDURE — 99233 SBSQ HOSP IP/OBS HIGH 50: CPT | Performed by: INTERNAL MEDICINE

## 2017-07-18 PROCEDURE — 80048 BASIC METABOLIC PNL TOTAL CA: CPT

## 2017-07-18 PROCEDURE — 36415 COLL VENOUS BLD VENIPUNCTURE: CPT

## 2017-07-18 PROCEDURE — 700111 HCHG RX REV CODE 636 W/ 250 OVERRIDE (IP): Performed by: INTERNAL MEDICINE

## 2017-07-18 PROCEDURE — 82962 GLUCOSE BLOOD TEST: CPT

## 2017-07-18 PROCEDURE — 85027 COMPLETE CBC AUTOMATED: CPT

## 2017-07-18 PROCEDURE — 700102 HCHG RX REV CODE 250 W/ 637 OVERRIDE(OP): Performed by: INTERNAL MEDICINE

## 2017-07-18 PROCEDURE — 84443 ASSAY THYROID STIM HORMONE: CPT

## 2017-07-18 PROCEDURE — 93306 TTE W/DOPPLER COMPLETE: CPT

## 2017-07-18 PROCEDURE — 770020 HCHG ROOM/CARE - TELE (206)

## 2017-07-18 PROCEDURE — 700111 HCHG RX REV CODE 636 W/ 250 OVERRIDE (IP): Performed by: NURSE PRACTITIONER

## 2017-07-18 PROCEDURE — A9270 NON-COVERED ITEM OR SERVICE: HCPCS | Performed by: INTERNAL MEDICINE

## 2017-07-18 RX ORDER — KETOROLAC TROMETHAMINE 30 MG/ML
15 INJECTION, SOLUTION INTRAMUSCULAR; INTRAVENOUS EVERY 6 HOURS PRN
Status: DISCONTINUED | OUTPATIENT
Start: 2017-07-18 | End: 2017-07-20 | Stop reason: HOSPADM

## 2017-07-18 RX ORDER — FUROSEMIDE 40 MG/1
40 TABLET ORAL
Status: DISCONTINUED | OUTPATIENT
Start: 2017-07-19 | End: 2017-07-19

## 2017-07-18 RX ADMIN — AMLODIPINE BESYLATE 5 MG: 5 TABLET ORAL at 08:34

## 2017-07-18 RX ADMIN — METOPROLOL TARTRATE 25 MG: 25 TABLET, FILM COATED ORAL at 21:16

## 2017-07-18 RX ADMIN — KETOROLAC TROMETHAMINE 15 MG: 30 INJECTION, SOLUTION INTRAMUSCULAR at 21:28

## 2017-07-18 RX ADMIN — DOXYCYCLINE 100 MG: 100 TABLET ORAL at 08:27

## 2017-07-18 RX ADMIN — ASPIRIN 325 MG: 325 TABLET, DELAYED RELEASE ORAL at 10:45

## 2017-07-18 RX ADMIN — SILDENAFIL 20 MG: 20 TABLET ORAL at 08:36

## 2017-07-18 RX ADMIN — INSULIN LISPRO 2 UNITS: 100 INJECTION, SOLUTION INTRAVENOUS; SUBCUTANEOUS at 17:30

## 2017-07-18 RX ADMIN — POTASSIUM CHLORIDE 10 MEQ: 1500 TABLET, EXTENDED RELEASE ORAL at 21:16

## 2017-07-18 RX ADMIN — DOXYCYCLINE 100 MG: 100 TABLET ORAL at 21:16

## 2017-07-18 RX ADMIN — ATORVASTATIN CALCIUM 40 MG: 40 TABLET, FILM COATED ORAL at 21:16

## 2017-07-18 RX ADMIN — HEPARIN SODIUM 5000 UNITS: 5000 INJECTION, SOLUTION INTRAVENOUS; SUBCUTANEOUS at 06:03

## 2017-07-18 RX ADMIN — HEPARIN SODIUM 5000 UNITS: 5000 INJECTION, SOLUTION INTRAVENOUS; SUBCUTANEOUS at 13:37

## 2017-07-18 RX ADMIN — HEPARIN SODIUM 5000 UNITS: 5000 INJECTION, SOLUTION INTRAVENOUS; SUBCUTANEOUS at 21:20

## 2017-07-18 RX ADMIN — INSULIN GLARGINE 30 UNITS: 100 INJECTION, SOLUTION SUBCUTANEOUS at 21:17

## 2017-07-18 RX ADMIN — SILDENAFIL 20 MG: 20 TABLET ORAL at 21:23

## 2017-07-18 RX ADMIN — KETOROLAC TROMETHAMINE 15 MG: 30 INJECTION, SOLUTION INTRAMUSCULAR at 01:01

## 2017-07-18 RX ADMIN — METOPROLOL TARTRATE 25 MG: 25 TABLET, FILM COATED ORAL at 08:27

## 2017-07-18 RX ADMIN — FUROSEMIDE 40 MG: 10 INJECTION, SOLUTION INTRAMUSCULAR; INTRAVENOUS at 06:03

## 2017-07-18 RX ADMIN — STANDARDIZED SENNA CONCENTRATE AND DOCUSATE SODIUM 2 TABLET: 8.6; 5 TABLET, FILM COATED ORAL at 21:16

## 2017-07-18 RX ADMIN — INSULIN LISPRO 8 UNITS: 100 INJECTION, SOLUTION INTRAVENOUS; SUBCUTANEOUS at 21:17

## 2017-07-18 RX ADMIN — ASPIRIN 81 MG: 81 TABLET ORAL at 08:27

## 2017-07-18 RX ADMIN — INSULIN LISPRO 5 UNITS: 100 INJECTION, SOLUTION INTRAVENOUS; SUBCUTANEOUS at 12:03

## 2017-07-18 RX ADMIN — GUAIFENESIN 600 MG: 600 TABLET, EXTENDED RELEASE ORAL at 08:27

## 2017-07-18 RX ADMIN — POTASSIUM CHLORIDE 10 MEQ: 1500 TABLET, EXTENDED RELEASE ORAL at 08:30

## 2017-07-18 RX ADMIN — OMEPRAZOLE 20 MG: 20 CAPSULE, DELAYED RELEASE ORAL at 08:27

## 2017-07-18 RX ADMIN — GUAIFENESIN 600 MG: 600 TABLET, EXTENDED RELEASE ORAL at 21:16

## 2017-07-18 ASSESSMENT — ENCOUNTER SYMPTOMS
HEMOPTYSIS: 0
NAUSEA: 0
CONSTIPATION: 0
MYALGIAS: 0
PALPITATIONS: 0
LOSS OF CONSCIOUSNESS: 0
EYE REDNESS: 0
WEAKNESS: 0
DIARRHEA: 0
ORTHOPNEA: 1
WHEEZING: 0
FEVER: 0
COUGH: 1
NERVOUS/ANXIOUS: 0
HEADACHES: 0
EYE PAIN: 0
INSOMNIA: 0
SHORTNESS OF BREATH: 1
FOCAL WEAKNESS: 0
DIZZINESS: 0
VOMITING: 0
BLOOD IN STOOL: 0
ABDOMINAL PAIN: 0
SEIZURES: 0
TREMORS: 0
FALLS: 0
CHILLS: 0

## 2017-07-18 ASSESSMENT — PAIN SCALES - GENERAL
PAINLEVEL_OUTOF10: 2
PAINLEVEL_OUTOF10: 8
PAINLEVEL_OUTOF10: 4
PAINLEVEL_OUTOF10: 2
PAINLEVEL_OUTOF10: 0
PAINLEVEL_OUTOF10: 2

## 2017-07-18 NOTE — CARE PLAN
Problem: Discharge Barriers/Planning  Goal: Patient’s continuum of care needs will be met  Outcome: PROGRESSING AS EXPECTED  Discussed heart failure and importance of lasix administration. Also educated patient on importance of daily weights. Language barrier exists to  language line was used to communicate.     Problem: Respiratory:  Goal: Respiratory status will improve  Outcome: PROGRESSING AS EXPECTED  Patients O2 is 98 on RA. Discussed possible complications of heart failure such as fluid overload and pulmonary edema. Patient expressed understanding education.

## 2017-07-18 NOTE — H&P
Hospital Medicine History and Physical    Date of Service  7/17/2017    Chief Complaint  Chief Complaint   Patient presents with   • Shortness of Breath     BIB EMS for SOB with exertion x 2 days. BLE pitting edema noted.        History of Presenting Illness  72 y.o. female who presented 7/17/2017 with history of diastolic CHF, last admission for this with volume overload May 2016, presents with increasing MANCERA.  No shawna orthopnea, some cough but no fevers, no purulence. Increased swelling in both legs up to her calves. No chest pain.  Not careful with dietary salt or fluid intake.    Primary Care Physician  Pcp Not In Computer    Consultants  None    Code Status  Full    Review of Systems  Review of Systems   Constitutional: Negative for fever and malaise/fatigue.   HENT: Negative.    Eyes: Negative.  Negative for blurred vision.   Respiratory: Positive for cough and shortness of breath. Negative for sputum production and wheezing.         Significant MANCERA   Cardiovascular: Positive for leg swelling. Negative for chest pain.   Gastrointestinal: Negative.  Negative for abdominal pain.   Genitourinary: Negative.  Negative for dysuria.   Musculoskeletal: Negative.  Negative for myalgias.   Skin: Negative.  Negative for rash.   Neurological: Negative.  Negative for dizziness, weakness and headaches.   Endo/Heme/Allergies: Negative.  Does not bruise/bleed easily.   Psychiatric/Behavioral: Negative.  Negative for depression.          Past Medical History  Past Medical History   Diagnosis Date   • Diabetes    • Hypertension    • Pneumonia 2008   • MI (myocardial infarction) (CMS-HCC)    • Renal disorder      Physician dx but patient unaware of condition       Surgical History  Past Surgical History   Procedure Laterality Date   • Hip nailing intramedullary  11/20/07     Performed by TJ KEYS at [facility] ORS   • Hip nailing intramedullary  11/20/07     Performed by TJ KEYS at [facility]   • Hysterectomy robotic  10/2/08     Performed by TJ MCCOY at [facility] ORS   • Other orthopedic surgery  2007     Broken Pelvis   • Gyn surgery       Hysterectomy   • Other abdominal surgery       lap choly       Medications  No current facility-administered medications on file prior to encounter.     Current Outpatient Prescriptions on File Prior to Encounter   Medication Sig Dispense Refill   • furosemide (LASIX) 40 MG Tab Take 1 Tab by mouth every day. 30 Tab 2   • potassium chloride SA (K-DUR) 10 MEQ Tab CR Take 1 Tab by mouth every day. 30 Tab 2   • atorvastatin (LIPITOR) 40 MG Tab Take 40 mg by mouth every evening.     • aspirin EC (ECOTRIN) 81 MG Tablet Delayed Response Take 81 mg by mouth every day.     • insulin lispro (HUMALOG) 100 UNIT/ML Solution Inject 5 Units as instructed 3 times a day before meals.     • metoprolol (LOPRESSOR) 25 MG Tab Take 25 mg by mouth 2 times a day.     • insulin glargine (LANTUS) 100 UNIT/ML SOLN Inject 30 Units as instructed every evening.     • omeprazole (PRILOSEC) 20 MG delayed-release capsule Take 20 mg by mouth every day.         Family History  Family History   Problem Relation Age of Onset   • Diabetes Mother        Social History  Social History   Substance Use Topics   • Smoking status: Former Smoker     Types: Cigarettes     Start date: 1974     Quit date: 2006   • Smokeless tobacco: Never Used   • Alcohol Use: No       Allergies  No Known Allergies     Physical Exam  Laboratory   Hemodynamics  Temp (24hrs), Av.7 °C (98 °F), Min:36.7 °C (98 °F), Max:36.7 °C (98 °F)   Temperature: 36.7 °C (98 °F)  Pulse  Av.2  Min: 66  Max: 83 Heart Rate (Monitored): 81  Blood Pressure : 135/51 mmHg, NIBP: 146/62 mmHg      Respiratory      Respiration: 16, Pulse Oximetry: 96 %     Work Of Breathing / Effort: Mild  RUL Breath Sounds: Clear, RML Breath Sounds: Clear, RLL Breath Sounds: Diminished, SHALONDA Breath Sounds: Clear, LLL Breath  Sounds: Diminished    Physical Exam   Constitutional: She is oriented to person, place, and time. She appears well-developed and well-nourished. No distress.   Serbian speaking, accompanied by daughter   MATHEW:   Head: Normocephalic and atraumatic.   Right Ear: External ear normal.   Left Ear: External ear normal.   Nose: Nose normal.   Mouth/Throat: Oropharynx is clear and moist. No oropharyngeal exudate.   Eyes: Conjunctivae and EOM are normal. Pupils are equal, round, and reactive to light. Right eye exhibits no discharge. Left eye exhibits no discharge. No scleral icterus.   Neck: Normal range of motion. Neck supple. JVD present. No tracheal deviation present. No thyromegaly present.   Cardiovascular: Normal rate, regular rhythm, normal heart sounds and intact distal pulses.  Exam reveals no gallop and no friction rub.    No murmur heard.  Pulmonary/Chest: Effort normal. No stridor. No respiratory distress. She has no wheezes. She has rales. She exhibits no tenderness.   Abdominal: Soft. Bowel sounds are normal. She exhibits no distension and no mass. There is no tenderness. There is no rebound and no guarding.   Musculoskeletal: Normal range of motion. She exhibits edema. She exhibits no tenderness.   Lymphadenopathy:     She has no cervical adenopathy.   Neurological: She is alert and oriented to person, place, and time. She has normal reflexes. No cranial nerve deficit. Coordination normal.   Skin: Skin is warm and dry. No rash noted. She is not diaphoretic. No erythema. No pallor.   Psychiatric: She has a normal mood and affect. Her behavior is normal. Judgment and thought content normal.   Nursing note and vitals reviewed.      Recent Labs      07/17/17   1056   WBC  10.2   RBC  3.81*   HEMOGLOBIN  10.7*   HEMATOCRIT  32.7*   MCV  85.8   MCH  28.1   MCHC  32.7*   RDW  37.5   PLATELETCT  218   MPV  12.9     Recent Labs      07/17/17   0929   SODIUM  130*   POTASSIUM  3.9   CHLORIDE  98   CO2  22   GLUCOSE   313*   BUN  39*   CREATININE  1.51*   CALCIUM  8.3*     Recent Labs      07/17/17   0929   ALTSGPT  14   ASTSGOT  17   ALKPHOSPHAT  121*   TBILIRUBIN  0.4   GLUCOSE  313*     Recent Labs      07/17/17   0929  07/17/17   1029   APTT   --   26.9   INR  0.93   --      Recent Labs      07/17/17   1056   BNPBTYPENAT  159*         Lab Results   Component Value Date    TROPONINI 0.04 07/17/2017       Imaging  CXR      1.  There is mild cardiomegaly.    2.  Mild poorly defined perihilar congestive changes are noted which could be due to edema or inflammation.    3.  No consolidations identified.   Assessment/Plan     I anticipate this patient will require at least two midnights for appropriate medical management, necessitating inpatient admission.    * Acute diastolic heart failure (CMS-[facility])  Assessment & Plan  Lasix, metoprolol, close watch, unclear why she takes sildenafil, last echo showed pulm pressure of 39    Diabetes mellitus type II, controlled (CMS-[facility]) (present on admission)  Assessment & Plan  Lantus, ISS    COPD (chronic obstructive pulmonary disease) (CMS-[facility]) (present on admission)  Assessment & Plan  Doxycycline, mucinex, antitussives, hold steroids, RT/O2    Hypertension (present on admission)  Assessment & Plan  Norvasc, metoprolol    Dyslipidemia  Assessment & Plan  Statin      VTE prophylaxis: lovenox.

## 2017-07-18 NOTE — RESPIRATORY CARE
COPD EDUCATION by COPD CLINICAL EDUCATOR  7/18/2017 at 6:55 AM by Rolanda Cisneros     Patient reviewed by COPD education team. Patient does not qualify for COPD program.

## 2017-07-18 NOTE — PROGRESS NOTES
Patient arrived to room 707-2 via transport from ER. Patient ambulated from rney in hallway to bathroom and then to bed. Transporter helped translate admission orientation and basic questions.  Bed alarm on safety. Patient c/o pain, will contact MD for prn pain medications.

## 2017-07-18 NOTE — ASSESSMENT & PLAN NOTE
Lasix, metoprolol, close watch, unclear why she takes sildenafil, last echo showed pulm pressure of 39. Improved now. Not needing oxygen.  Holding of Lasix now because of worsening creatinine.  Creatinine improved now. I will hold off giving any further diuretic as she is euvolemic.  Follow up to discharge clinic in 1 week. Reassess if diuretics need to be given at some point as currently she went into acute kidney injury from overdiuresis. BMP can be obtained at that visit.  Follow up with her primary care physician in one week to reassess. BMP can be obtained at that visit. Referral to Cardiology will be deferred to them as is elective stress testing.

## 2017-07-18 NOTE — DISCHARGE PLANNING
Upon utilization review, patient noted to be on the following medications that could potentially require prior authorization if prescribed at discharge: revatio.  If it is anticipated that patient will require these medications at discharge, beginning the prescription prior auth process in advance to anticipated discharge could assist in preventing delays when patient is medically cleared to be discharged from the hospital.

## 2017-07-18 NOTE — PROGRESS NOTES
Jonnathan Stein on floor. Discussed patient's HR has been dipping into the low 40's but sustaining 50's. /83. Verbal Okay to give 40 IV lasix at 0600.

## 2017-07-18 NOTE — PROGRESS NOTES
"Another RN that speaks Nepalese interpreted for this encounter: After patient was up to bathroom, this RN asked patient to rerate pain after Tylenol was given. Patient pointed to left side of chest and shoulder and stated \"ocho\". Per the RN translating she states that it hurts more with movement and towards the back than chest. Clarified falling and patient stated that she fell on that side when she fell. Patient requesting more pain medication than Tylenol. Patient denies dizziness.  Jonnathan Stein on floor, order received for Toradol 30mg IV Q6H prn.   "

## 2017-07-18 NOTE — ASSESSMENT & PLAN NOTE
JESSE Sandhu reconciled and will give scripts for.  Follow up to discharge clinic in 1 week.   Follow up with her primary care physician in one week to reassess.

## 2017-07-18 NOTE — PROGRESS NOTES
Bedside report completed, patient sitting comfortably on couch. Reports 2/10 shoulder pain from fall at home. Language barrier exists, language line being used for translation. Fall precautions in place with bed alarm on, nonskid footwear intact, bed in lowest position, and belongings within reach. All patient needs met at this time.

## 2017-07-18 NOTE — PROGRESS NOTES
Monitor Summary  Admit SR 70-83   0.2/0.12/0.38  Converted to atrial fibrillation at 2114  52-70 with low 42  Rare PVCs

## 2017-07-18 NOTE — PROGRESS NOTES
[facility] Hospitalist Progress Note    Date of Service: 2017    Chief Complaint  72 y.o. female admitted 2017 with Shortness of Breath    Interval Problem Update  Mild chest pressures recently, none now. Not short of breath at rest.     Consultants/Specialty    Disposition  PT pending eval        Review of Systems   Constitutional: Negative for fever and chills.   HENT: Negative for congestion, hearing loss and nosebleeds.    Eyes: Negative for pain and redness.   Respiratory: Positive for cough and shortness of breath. Negative for hemoptysis and wheezing.    Cardiovascular: Positive for chest pain, orthopnea and leg swelling. Negative for palpitations.   Gastrointestinal: Negative for nausea, vomiting, abdominal pain, diarrhea, constipation and blood in stool.   Genitourinary: Negative for dysuria, frequency and hematuria.   Musculoskeletal: Negative for myalgias, joint pain and falls.   Skin: Negative for rash.   Neurological: Negative for dizziness, tremors, focal weakness, seizures, loss of consciousness, weakness and headaches.   Psychiatric/Behavioral: The patient is not nervous/anxious and does not have insomnia.    All other systems reviewed and are negative.     Physical Exam  Laboratory/Imaging   Hemodynamics  Temp (24hrs), Av.6 °C (97.9 °F), Min:35.9 °C (96.7 °F), Max:36.9 °C (98.5 °F)   Temperature: 36.3 °C (97.4 °F)  Pulse  Av.9  Min: 52  Max: 84 Heart Rate (Monitored): 81  Blood Pressure : 139/65 mmHg, NIBP: 135/46 mmHg      Respiratory      Respiration: 17, Pulse Oximetry: 94 %     Work Of Breathing / Effort: Mild  RUL Breath Sounds: Clear, RML Breath Sounds: Clear, RLL Breath Sounds: Fine Crackles, SHALONDA Breath Sounds: Clear, LLL Breath Sounds: Fine Crackles    Fluids    Intake/Output Summary (Last 24 hours) at 17 1612  Last data filed at 17 1500   Gross per 24 hour   Intake    600 ml   Output   2050 ml   Net  -1450 ml       Nutrition  Orders Placed This Encounter    Procedures   • Diet Order     Standing Status: Standing      Number of Occurrences: 1      Standing Expiration Date:      Order Specific Question:  Diet:     Answer:  Renal [8]     Order Specific Question:  Diet:     Answer:  Diabetic [3]     Physical Exam   Constitutional: She appears well-developed and well-nourished.   HENT:   Head: Normocephalic and atraumatic.   Eyes: Conjunctivae and EOM are normal. No scleral icterus.   Neck: Normal range of motion. Neck supple. JVD present.   Cardiovascular: Normal rate and regular rhythm.  Exam reveals no gallop and no friction rub.    No murmur heard.  Pulmonary/Chest: Effort normal and breath sounds normal. No respiratory distress. She has no wheezes. She has no rales.   Abdominal: Soft. Bowel sounds are normal. She exhibits no distension. There is no tenderness. There is no rebound and no guarding.   Musculoskeletal: She exhibits edema. She exhibits no tenderness.   Neurological: She is alert.   Skin: Skin is warm.   Psychiatric: She has a normal mood and affect. Her behavior is normal.       Recent Labs      07/17/17   1056  07/18/17   0248   WBC  10.2  9.9   RBC  3.81*  3.97*   HEMOGLOBIN  10.7*  11.0*   HEMATOCRIT  32.7*  33.9*   MCV  85.8  85.4   MCH  28.1  27.7   MCHC  32.7*  32.4*   RDW  37.5  37.6   PLATELETCT  218  221   MPV  12.9  12.9     Recent Labs      07/17/17   0929  07/18/17   0248   SODIUM  130*  136   POTASSIUM  3.9  4.0   CHLORIDE  98  102   CO2  22  26   GLUCOSE  313*  236*   BUN  39*  40*   CREATININE  1.51*  1.70*   CALCIUM  8.3*  8.2*     Recent Labs      07/17/17   0929  07/17/17   1029   APTT   --   26.9   INR  0.93   --      Recent Labs      07/17/17   1056   BNPBTYPENAT  159*              Assessment/Plan     * Acute diastolic heart failure (CMS-HCC)  Assessment & Plan  Lasix, metoprolol, close watch, unclear why she takes sildenafil, last echo showed pulm pressure of 39. Improved now. Not needing oxygen.    Hypertension (present on  admission)  Assessment & Plan  Norvasc, metoprolol    Diabetes mellitus type II, controlled (CMS-[facility]) (present on admission)  Assessment & Plan  Lantus, ISS    COPD (chronic obstructive pulmonary disease) (CMS-[facility]) (present on admission)  Assessment & Plan  Doxycycline, mucinex, antitussives, hold steroids, RT/O2  Improved. Off oxygen.    Dyslipidemia  Assessment & Plan  Statin    I spent 39 minutes, reviewing the chart, notes, vitals, labs, imaging, ordering labs, evaluating Joanne Yepez for assessment, enacting the plan above. 50% of the time was spent in counseling Joanne Yepez and answering questions. Time was devoted to counseling and coordinating care including review of records, pertinent lab data and studies, as well as discussing diagnostic evaluation and work up, planned therapeutic interventions and future disposition of care. Where indicated, the assessment and plan reflect discussion of patient with consultants, other healthcare providers, family members, and additional research needed to obtain further information in formulating the plan of care for Joanne Yepez.     Core Measures

## 2017-07-18 NOTE — PROGRESS NOTES
Language line used for medication administration. Patient c/o headache and arthritic pain, prn Tylenol given. Patient given snack per request. Denies further needs.

## 2017-07-18 NOTE — PROGRESS NOTES
Jonnathan Stein on floor. Order received for prn Tylenol 650 mg Q4H. Monitor room also called stating patient has converted into afib. Per Jonnathan, hold metoprolol since HR 55-70. Continue to monitor rate.

## 2017-07-18 NOTE — CARE PLAN
Problem: Safety  Goal: Will remain free from falls  Outcome: PROGRESSING AS EXPECTED  Patient calls for assistance. Bed alarm on and functioning.     Problem: Respiratory:  Goal: Respiratory status will improve  Outcome: PROGRESSING AS EXPECTED  Patient tolerating room air well     Problem: Urinary Elimination:  Goal: Ability to reestablish a normal urinary elimination pattern will improve  Outcome: PROGRESSING AS EXPECTED  Patient has frequency due to IV Lasix. Patient voiding clear yellow urine

## 2017-07-18 NOTE — ASSESSMENT & PLAN NOTE
Norvasc, metoprolol. Stable. Continue.  Recommend her having to keep a journal of blood pressures obtained at home twice a day for review with primary care.  Follow up to discharge clinic in 1 week. BMP can be obtained at that visit.  Follow up with her primary care physician in one week to reassess. BMP can be obtained at that visit.

## 2017-07-19 ENCOUNTER — APPOINTMENT (OUTPATIENT)
Dept: RADIOLOGY | Facility: MEDICAL CENTER | Age: 72
DRG: 292 | End: 2017-07-19
Attending: INTERNAL MEDICINE
Payer: MEDICARE

## 2017-07-19 PROBLEM — N17.9 ACUTE KIDNEY INJURY (HCC): Status: ACTIVE | Noted: 2017-07-19

## 2017-07-19 LAB
ALBUMIN SERPL BCP-MCNC: 3.2 G/DL (ref 3.2–4.9)
ANION GAP SERPL CALC-SCNC: 9 MMOL/L (ref 0–11.9)
APPEARANCE UR: CLEAR
BACTERIA #/AREA URNS HPF: NEGATIVE /HPF
BILIRUB UR QL STRIP.AUTO: NEGATIVE
BUN SERPL-MCNC: 43 MG/DL (ref 8–22)
BUN SERPL-MCNC: 44 MG/DL (ref 8–22)
CALCIUM SERPL-MCNC: 8.2 MG/DL (ref 8.5–10.5)
CALCIUM SERPL-MCNC: 8.6 MG/DL (ref 8.5–10.5)
CHLORIDE SERPL-SCNC: 100 MMOL/L (ref 96–112)
CHLORIDE SERPL-SCNC: 100 MMOL/L (ref 96–112)
CO2 SERPL-SCNC: 21 MMOL/L (ref 20–33)
CO2 SERPL-SCNC: 23 MMOL/L (ref 20–33)
COLOR UR: YELLOW
CREAT SERPL-MCNC: 1.92 MG/DL (ref 0.5–1.4)
CREAT SERPL-MCNC: 1.95 MG/DL (ref 0.5–1.4)
EPI CELLS #/AREA URNS HPF: ABNORMAL /HPF
ERYTHROCYTE [DISTWIDTH] IN BLOOD BY AUTOMATED COUNT: 38 FL (ref 35.9–50)
GFR SERPL CREATININE-BSD FRML MDRD: 25 ML/MIN/1.73 M 2
GFR SERPL CREATININE-BSD FRML MDRD: 26 ML/MIN/1.73 M 2
GLUCOSE BLD-MCNC: 126 MG/DL (ref 65–99)
GLUCOSE BLD-MCNC: 154 MG/DL (ref 65–99)
GLUCOSE BLD-MCNC: 184 MG/DL (ref 65–99)
GLUCOSE SERPL-MCNC: 239 MG/DL (ref 65–99)
GLUCOSE SERPL-MCNC: 289 MG/DL (ref 65–99)
GLUCOSE UR STRIP.AUTO-MCNC: 100 MG/DL
HCT VFR BLD AUTO: 32.6 % (ref 37–47)
HGB BLD-MCNC: 10.7 G/DL (ref 12–16)
HYALINE CASTS #/AREA URNS LPF: ABNORMAL /LPF
KETONES UR STRIP.AUTO-MCNC: NEGATIVE MG/DL
LEUKOCYTE ESTERASE UR QL STRIP.AUTO: ABNORMAL
MCH RBC QN AUTO: 28.1 PG (ref 27–33)
MCHC RBC AUTO-ENTMCNC: 32.8 G/DL (ref 33.6–35)
MCV RBC AUTO: 85.6 FL (ref 81.4–97.8)
MICRO URNS: ABNORMAL
NITRITE UR QL STRIP.AUTO: NEGATIVE
PH UR STRIP.AUTO: 6 [PH]
PHOSPHATE SERPL-MCNC: 4 MG/DL (ref 2.5–4.5)
PLATELET # BLD AUTO: 221 K/UL (ref 164–446)
PMV BLD AUTO: 12.9 FL (ref 9–12.9)
POTASSIUM SERPL-SCNC: 4.1 MMOL/L (ref 3.6–5.5)
POTASSIUM SERPL-SCNC: 4.1 MMOL/L (ref 3.6–5.5)
PROT UR QL STRIP: 300 MG/DL
RBC # BLD AUTO: 3.81 M/UL (ref 4.2–5.4)
RBC # URNS HPF: ABNORMAL /HPF
RBC UR QL AUTO: ABNORMAL
SODIUM SERPL-SCNC: 129 MMOL/L (ref 135–145)
SODIUM SERPL-SCNC: 132 MMOL/L (ref 135–145)
SP GR UR STRIP.AUTO: 1.01
UROBILINOGEN UR STRIP.AUTO-MCNC: 0.2 MG/DL
WBC # BLD AUTO: 11.1 K/UL (ref 4.8–10.8)
WBC #/AREA URNS HPF: ABNORMAL /HPF
YEAST BUDDING URNS QL: PRESENT /HPF

## 2017-07-19 PROCEDURE — 80069 RENAL FUNCTION PANEL: CPT

## 2017-07-19 PROCEDURE — A9270 NON-COVERED ITEM OR SERVICE: HCPCS | Performed by: INTERNAL MEDICINE

## 2017-07-19 PROCEDURE — 85027 COMPLETE CBC AUTOMATED: CPT

## 2017-07-19 PROCEDURE — 700102 HCHG RX REV CODE 250 W/ 637 OVERRIDE(OP): Performed by: INTERNAL MEDICINE

## 2017-07-19 PROCEDURE — 36415 COLL VENOUS BLD VENIPUNCTURE: CPT

## 2017-07-19 PROCEDURE — 71010 DX-CHEST-PORTABLE (1 VIEW): CPT

## 2017-07-19 PROCEDURE — 76775 US EXAM ABDO BACK WALL LIM: CPT

## 2017-07-19 PROCEDURE — 99233 SBSQ HOSP IP/OBS HIGH 50: CPT | Performed by: INTERNAL MEDICINE

## 2017-07-19 PROCEDURE — 700111 HCHG RX REV CODE 636 W/ 250 OVERRIDE (IP): Performed by: INTERNAL MEDICINE

## 2017-07-19 PROCEDURE — 770020 HCHG ROOM/CARE - TELE (206)

## 2017-07-19 PROCEDURE — 82962 GLUCOSE BLOOD TEST: CPT | Mod: 91

## 2017-07-19 PROCEDURE — 81001 URINALYSIS AUTO W/SCOPE: CPT

## 2017-07-19 PROCEDURE — 80048 BASIC METABOLIC PNL TOTAL CA: CPT

## 2017-07-19 PROCEDURE — 700102 HCHG RX REV CODE 250 W/ 637 OVERRIDE(OP): Performed by: NURSE PRACTITIONER

## 2017-07-19 PROCEDURE — A9270 NON-COVERED ITEM OR SERVICE: HCPCS | Performed by: NURSE PRACTITIONER

## 2017-07-19 RX ADMIN — METOPROLOL TARTRATE 25 MG: 25 TABLET, FILM COATED ORAL at 21:35

## 2017-07-19 RX ADMIN — SILDENAFIL 20 MG: 20 TABLET ORAL at 21:35

## 2017-07-19 RX ADMIN — GUAIFENESIN 600 MG: 600 TABLET, EXTENDED RELEASE ORAL at 08:45

## 2017-07-19 RX ADMIN — INSULIN LISPRO 2 UNITS: 100 INJECTION, SOLUTION INTRAVENOUS; SUBCUTANEOUS at 17:22

## 2017-07-19 RX ADMIN — ATORVASTATIN CALCIUM 40 MG: 40 TABLET, FILM COATED ORAL at 21:35

## 2017-07-19 RX ADMIN — INSULIN GLARGINE 30 UNITS: 100 INJECTION, SOLUTION SUBCUTANEOUS at 21:36

## 2017-07-19 RX ADMIN — DOXYCYCLINE 100 MG: 100 TABLET ORAL at 08:45

## 2017-07-19 RX ADMIN — ACETAMINOPHEN 650 MG: 325 TABLET, FILM COATED ORAL at 21:43

## 2017-07-19 RX ADMIN — DOXYCYCLINE 100 MG: 100 TABLET ORAL at 21:35

## 2017-07-19 RX ADMIN — OMEPRAZOLE 20 MG: 20 CAPSULE, DELAYED RELEASE ORAL at 08:45

## 2017-07-19 RX ADMIN — METOPROLOL TARTRATE 25 MG: 25 TABLET, FILM COATED ORAL at 08:45

## 2017-07-19 RX ADMIN — STANDARDIZED SENNA CONCENTRATE AND DOCUSATE SODIUM 2 TABLET: 8.6; 5 TABLET, FILM COATED ORAL at 08:45

## 2017-07-19 RX ADMIN — HEPARIN SODIUM 5000 UNITS: 5000 INJECTION, SOLUTION INTRAVENOUS; SUBCUTANEOUS at 14:48

## 2017-07-19 RX ADMIN — GUAIFENESIN 600 MG: 600 TABLET, EXTENDED RELEASE ORAL at 21:35

## 2017-07-19 RX ADMIN — HEPARIN SODIUM 5000 UNITS: 5000 INJECTION, SOLUTION INTRAVENOUS; SUBCUTANEOUS at 21:35

## 2017-07-19 RX ADMIN — AMLODIPINE BESYLATE 5 MG: 5 TABLET ORAL at 08:45

## 2017-07-19 RX ADMIN — HEPARIN SODIUM 5000 UNITS: 5000 INJECTION, SOLUTION INTRAVENOUS; SUBCUTANEOUS at 05:55

## 2017-07-19 RX ADMIN — SILDENAFIL 20 MG: 20 TABLET ORAL at 08:45

## 2017-07-19 RX ADMIN — INSULIN LISPRO 2 UNITS: 100 INJECTION, SOLUTION INTRAVENOUS; SUBCUTANEOUS at 13:06

## 2017-07-19 RX ADMIN — ASPIRIN 325 MG: 325 TABLET, DELAYED RELEASE ORAL at 08:45

## 2017-07-19 RX ADMIN — INSULIN LISPRO 5 UNITS: 100 INJECTION, SOLUTION INTRAVENOUS; SUBCUTANEOUS at 21:36

## 2017-07-19 RX ADMIN — STANDARDIZED SENNA CONCENTRATE AND DOCUSATE SODIUM 2 TABLET: 8.6; 5 TABLET, FILM COATED ORAL at 21:40

## 2017-07-19 ASSESSMENT — ENCOUNTER SYMPTOMS
MYALGIAS: 0
FEVER: 0
WEAKNESS: 0
TREMORS: 0
INSOMNIA: 0
ORTHOPNEA: 1
LOSS OF CONSCIOUSNESS: 0
PALPITATIONS: 0
WHEEZING: 0
CONSTIPATION: 0
NERVOUS/ANXIOUS: 0
EYE REDNESS: 0
COUGH: 1
HEADACHES: 0
DIZZINESS: 0
BLOOD IN STOOL: 0
FOCAL WEAKNESS: 0
DIARRHEA: 0
CHILLS: 0
NAUSEA: 0
FALLS: 0
SHORTNESS OF BREATH: 1
SEIZURES: 0
HEMOPTYSIS: 0
VOMITING: 0
ABDOMINAL PAIN: 0
EYE PAIN: 0

## 2017-07-19 ASSESSMENT — PAIN SCALES - GENERAL
PAINLEVEL_OUTOF10: 3
PAINLEVEL_OUTOF10: 5

## 2017-07-19 ASSESSMENT — LIFESTYLE VARIABLES: DO YOU DRINK ALCOHOL: NO

## 2017-07-19 NOTE — PROGRESS NOTES
Report received at bedside, assumed care. Pt is resting in bed. Family present at this time. A&O x 4. Declines pain at this time. No other concerns, complains or distress. Tele box on. Chart reviewed. Bed in lowest position, treaded slipper sock on, and call light within reach.

## 2017-07-19 NOTE — CARE PLAN
Problem: Communication  Goal: The ability to communicate needs accurately and effectively will improve  Outcome: PROGRESSING AS EXPECTED  Pt understands plan of care. Will educate as needed.

## 2017-07-19 NOTE — CARE PLAN
Problem: Safety  Goal: Will remain free from injury  Intervention: Provide assistance with mobility  Educated patient on use of call light, no slip socks on, bed lowest position. All needs attended to. Patient verbalized understanding.           Problem: Pain Management  Goal: Pain level will decrease to patient’s comfort goal  Intervention: Follow pain managment plan developed in collaboration with patient and Interdisciplinary Team  Listened to patients discussion of pain. Discussed with patient pain goal and management through medications. Supported and educated patient by addressing specific questions regarding diagnosis, risks, benefits of pain management treatment.

## 2017-07-19 NOTE — ASSESSMENT & PLAN NOTE
Mild, likely from diuresis. Hold off Lasix now and trend creatinine. Get renal U/S to r/o obstruction. That came back no hydronephrosis or inflammatory markings.  After holding off Lasix, Cr- has trended down to near normal levels.  Hold off any further diuresis as she is close to dry side of euvolemia.  Follow up to discharge clinic in 1 week. Reassess if diuretics need to be given at some point as currently she went into acute kidney injury from overdiuresis. BMP can be obtained at that visit.  Follow up with her primary care physician in one week to reassess. BMP can be obtained at that visit.

## 2017-07-19 NOTE — PROGRESS NOTES
Pt observed, VSS, Pt  c/o pain mild pain at left  Pt AAOx4, no other signs or symptoms of distress, fall precautions in place, call light within reach, all questions answered, will continue to monitor.

## 2017-07-19 NOTE — PROGRESS NOTES
[facility] Hospitalist Progress Note    Date of Service: 2017    Chief Complaint  72 y.o. female admitted 2017 with Shortness of Breath    Interval Problem Update  Improved. Has atypical pleuritic R axillary pain which she says is gas, but CXR is normal.Cr- slowly creeping up     Consultants/Specialty    Disposition  PT pending eval        Review of Systems   Constitutional: Negative for fever and chills.   HENT: Negative for congestion, hearing loss and nosebleeds.    Eyes: Negative for pain and redness.   Respiratory: Positive for cough and shortness of breath. Negative for hemoptysis and wheezing.    Cardiovascular: Positive for chest pain, orthopnea and leg swelling. Negative for palpitations.   Gastrointestinal: Negative for nausea, vomiting, abdominal pain, diarrhea, constipation and blood in stool.   Genitourinary: Negative for dysuria, frequency and hematuria.   Musculoskeletal: Negative for myalgias, joint pain and falls.   Skin: Negative for rash.   Neurological: Negative for dizziness, tremors, focal weakness, seizures, loss of consciousness, weakness and headaches.   Psychiatric/Behavioral: The patient is not nervous/anxious and does not have insomnia.    All other systems reviewed and are negative.     Physical Exam  Laboratory/Imaging   Hemodynamics  Temp (24hrs), Av.5 °C (97.7 °F), Min:35.7 °C (96.3 °F), Max:36.9 °C (98.5 °F)   Temperature: 36.2 °C (97.2 °F)  Pulse  Av.8  Min: 52  Max: 85    Blood Pressure : 131/69 mmHg      Respiratory      Respiration: 16, Pulse Oximetry: 98 %     Work Of Breathing / Effort: Mild  RUL Breath Sounds: Clear, RML Breath Sounds: Clear, RLL Breath Sounds: Fine Crackles, SHALONDA Breath Sounds: Clear, LLL Breath Sounds: Fine Crackles    Fluids    Intake/Output Summary (Last 24 hours) at 17 1612  Last data filed at 17 1500   Gross per 24 hour   Intake    600 ml   Output   2050 ml   Net  -1450 ml       Nutrition  Orders Placed This Encounter    Procedures   • Diet Order     Standing Status: Standing      Number of Occurrences: 1      Standing Expiration Date:      Order Specific Question:  Diet:     Answer:  Renal [8]     Order Specific Question:  Diet:     Answer:  Diabetic [3]     Physical Exam   Constitutional: She appears well-developed and well-nourished.   HENT:   Head: Normocephalic and atraumatic.   Eyes: Conjunctivae and EOM are normal. No scleral icterus.   Neck: Normal range of motion. Neck supple. JVD present.   Cardiovascular: Normal rate and regular rhythm.  Exam reveals no gallop and no friction rub.    No murmur heard.  Pulmonary/Chest: Effort normal and breath sounds normal. No respiratory distress. She has no wheezes. She has no rales.   Abdominal: Soft. Bowel sounds are normal. She exhibits no distension. There is no tenderness. There is no rebound and no guarding.   Musculoskeletal: She exhibits edema. She exhibits no tenderness.   Neurological: She is alert.   Skin: Skin is warm.   Psychiatric: She has a normal mood and affect. Her behavior is normal.       Lab Results   Component Value Date/Time    WBC 11.1* 07/19/2017 02:27 AM    RBC 3.81* 07/19/2017 02:27 AM    HEMOGLOBIN 10.7* 07/19/2017 02:27 AM    HEMATOCRIT 32.6* 07/19/2017 02:27 AM    MCV 85.6 07/19/2017 02:27 AM    MCH 28.1 07/19/2017 02:27 AM    MCHC 32.8* 07/19/2017 02:27 AM    MPV 12.9 07/19/2017 02:27 AM    NEUTROPHILS-POLYS 71.10 07/17/2017 10:56 AM    LYMPHOCYTES 19.50* 07/17/2017 10:56 AM    MONOCYTES 7.90 07/17/2017 10:56 AM    EOSINOPHILS 0.80 07/17/2017 10:56 AM    BASOPHILS 0.40 07/17/2017 10:56 AM    HYPOCHROMIA 1+ 04/11/2013 03:26 AM      Lab Results   Component Value Date/Time    SODIUM 132* 07/19/2017 02:27 AM    POTASSIUM 4.1 07/19/2017 02:27 AM    CHLORIDE 100 07/19/2017 02:27 AM    CO2 23 07/19/2017 02:27 AM    GLUCOSE 239* 07/19/2017 02:27 AM    BUN 44* 07/19/2017 02:27 AM    CREATININE 1.92* 07/19/2017 02:27 AM           Assessment/Plan     * Acute  diastolic heart failure (CMS-HCC)  Assessment & Plan  Lasix, metoprolol, close watch, unclear why she takes sildenafil, last echo showed pulm pressure of 39. Improved now. Not needing oxygen.  Holding of Lasix now because of worsening creatinine.    Acute kidney injury (CMS-HCC)  Assessment & Plan  Mild, likely from diuresis. Hold off Lasix now and trend creatinine. Get renal U/S to r/o obstruction. That came back no hydronephrosis or inflammatory markings.  Creatinine trending slightly up still. Will give IVF back for now. Continue to monitor. Nephrology consultation if worsening tomorrow.    Hypertension (present on admission)  Assessment & Plan  Norvasc, metoprolol    Diabetes mellitus type II, controlled (CMS-HCC) (present on admission)  Assessment & Plan  Lantus, ISS    COPD (chronic obstructive pulmonary disease) (CMS-HCC) (present on admission)  Assessment & Plan  Doxycycline, mucinex, antitussives, hold steroids, RT/O2  Improved. Off oxygen.    Dyslipidemia  Assessment & Plan  Statin      I spent 35 minutes, reviewing the chart, notes, vitals, labs, imaging, ordering labs, evaluating Joanne Yepez for assessment, enacting the plan above. 50% of the time was spent in counseling Joanne Yepez and answering questions. Time was devoted to counseling and coordinating care including review of records, pertinent lab data and studies, as well as discussing diagnostic evaluation and work up, planned therapeutic interventions and future disposition of care. Where indicated, the assessment and plan reflect discussion of patient with consultants, other healthcare providers, family members, and additional research needed to obtain further information in formulating the plan of care for Joanne Yepez.             DVT Prophylaxis: Heparin

## 2017-07-19 NOTE — CARE PLAN
Problem: Safety  Goal: Will remain free from injury  Outcome: PROGRESSING AS EXPECTED  Fall precautions in place, treaded socks on, bed alarm on, educated pt on using call light for assistance. Reminders needed.

## 2017-07-19 NOTE — HEART FAILURE PROGRAM
"Cardiovascular Nurse Navigator () Progress Note:     Please note this is a HF pt who lives in [place], [place] but is staying with her daughter in [place] for 20 days. As such, [facility] and she will see her PCP when she returns home and establish with cardiology.      Bedside nursing to please provide patient with HF packet and begin teaching and documenting in education tab, each shift should teach sections until all are covered.     When completing the after visit summary (discharge instructions) please select \"Cardiac Diagnosis, and Heart Failure\" in the special instructions section so that the heart failure discharge instructions will populate.     Thank you and please call [facility] with any questions: [phone].   "

## 2017-07-20 ENCOUNTER — PATIENT OUTREACH (OUTPATIENT)
Dept: HEALTH INFORMATION MANAGEMENT | Facility: OTHER | Age: 72
End: 2017-07-20

## 2017-07-20 VITALS
WEIGHT: 165.79 LBS | BODY MASS INDEX: 31.3 KG/M2 | HEART RATE: 77 BPM | OXYGEN SATURATION: 97 % | DIASTOLIC BLOOD PRESSURE: 69 MMHG | SYSTOLIC BLOOD PRESSURE: 138 MMHG | HEIGHT: 61 IN | RESPIRATION RATE: 16 BRPM | TEMPERATURE: 96 F

## 2017-07-20 LAB
ALBUMIN SERPL BCP-MCNC: 3.1 G/DL (ref 3.2–4.9)
ALBUMIN SERPL BCP-MCNC: 3.4 G/DL (ref 3.2–4.9)
BUN SERPL-MCNC: 37 MG/DL (ref 8–22)
BUN SERPL-MCNC: 43 MG/DL (ref 8–22)
CALCIUM SERPL-MCNC: 8.7 MG/DL (ref 8.5–10.5)
CALCIUM SERPL-MCNC: 8.7 MG/DL (ref 8.5–10.5)
CHLORIDE SERPL-SCNC: 102 MMOL/L (ref 96–112)
CHLORIDE SERPL-SCNC: 103 MMOL/L (ref 96–112)
CO2 SERPL-SCNC: 22 MMOL/L (ref 20–33)
CO2 SERPL-SCNC: 22 MMOL/L (ref 20–33)
CREAT SERPL-MCNC: 1.51 MG/DL (ref 0.5–1.4)
CREAT SERPL-MCNC: 1.83 MG/DL (ref 0.5–1.4)
GFR SERPL CREATININE-BSD FRML MDRD: 27 ML/MIN/1.73 M 2
GFR SERPL CREATININE-BSD FRML MDRD: 34 ML/MIN/1.73 M 2
GLUCOSE BLD-MCNC: 127 MG/DL (ref 65–99)
GLUCOSE BLD-MCNC: 179 MG/DL (ref 65–99)
GLUCOSE BLD-MCNC: 257 MG/DL (ref 65–99)
GLUCOSE SERPL-MCNC: 163 MG/DL (ref 65–99)
GLUCOSE SERPL-MCNC: 179 MG/DL (ref 65–99)
PHOSPHATE SERPL-MCNC: 3.7 MG/DL (ref 2.5–4.5)
PHOSPHATE SERPL-MCNC: 3.9 MG/DL (ref 2.5–4.5)
POTASSIUM SERPL-SCNC: 3.9 MMOL/L (ref 3.6–5.5)
POTASSIUM SERPL-SCNC: 4.1 MMOL/L (ref 3.6–5.5)
SODIUM SERPL-SCNC: 130 MMOL/L (ref 135–145)
SODIUM SERPL-SCNC: 136 MMOL/L (ref 135–145)

## 2017-07-20 PROCEDURE — 36415 COLL VENOUS BLD VENIPUNCTURE: CPT

## 2017-07-20 PROCEDURE — A9270 NON-COVERED ITEM OR SERVICE: HCPCS | Performed by: INTERNAL MEDICINE

## 2017-07-20 PROCEDURE — 97161 PT EVAL LOW COMPLEX 20 MIN: CPT

## 2017-07-20 PROCEDURE — 700111 HCHG RX REV CODE 636 W/ 250 OVERRIDE (IP): Performed by: INTERNAL MEDICINE

## 2017-07-20 PROCEDURE — G8979 MOBILITY GOAL STATUS: HCPCS | Mod: CI

## 2017-07-20 PROCEDURE — 99239 HOSP IP/OBS DSCHRG MGMT >30: CPT | Performed by: INTERNAL MEDICINE

## 2017-07-20 PROCEDURE — G8980 MOBILITY D/C STATUS: HCPCS | Mod: CI

## 2017-07-20 PROCEDURE — G8978 MOBILITY CURRENT STATUS: HCPCS | Mod: CI

## 2017-07-20 PROCEDURE — 700102 HCHG RX REV CODE 250 W/ 637 OVERRIDE(OP): Performed by: INTERNAL MEDICINE

## 2017-07-20 PROCEDURE — 82962 GLUCOSE BLOOD TEST: CPT

## 2017-07-20 PROCEDURE — 80069 RENAL FUNCTION PANEL: CPT | Mod: 91

## 2017-07-20 RX ORDER — GUAIFENESIN 600 MG/1
600 TABLET, EXTENDED RELEASE ORAL EVERY 12 HOURS
Qty: 28 TAB | COMMUNITY
Start: 2017-07-20

## 2017-07-20 RX ORDER — ACETAMINOPHEN 325 MG/1
650 TABLET ORAL EVERY 4 HOURS PRN
Qty: 30 TAB | Refills: 0 | COMMUNITY
Start: 2017-07-20

## 2017-07-20 RX ORDER — SODIUM CHLORIDE 9 MG/ML
500 INJECTION, SOLUTION INTRAVENOUS ONCE
Status: DISCONTINUED | OUTPATIENT
Start: 2017-07-20 | End: 2017-07-20 | Stop reason: HOSPADM

## 2017-07-20 RX ORDER — AMOXICILLIN 250 MG
2 CAPSULE ORAL 2 TIMES DAILY
Qty: 30 TAB | Refills: 0 | Status: SHIPPED | OUTPATIENT
Start: 2017-07-20

## 2017-07-20 RX ORDER — OMEPRAZOLE 20 MG/1
20 CAPSULE, DELAYED RELEASE ORAL DAILY
Qty: 30 CAP | Refills: 0 | Status: SHIPPED | OUTPATIENT
Start: 2017-07-20 | End: 2017-07-24 | Stop reason: SDUPTHER

## 2017-07-20 RX ORDER — DOXYCYCLINE 100 MG/1
100 TABLET ORAL EVERY 12 HOURS
Qty: 20 TAB | Refills: 0 | Status: SHIPPED | OUTPATIENT
Start: 2017-07-20 | End: 2017-07-21

## 2017-07-20 RX ADMIN — AMLODIPINE BESYLATE 5 MG: 5 TABLET ORAL at 09:00

## 2017-07-20 RX ADMIN — INSULIN LISPRO 2 UNITS: 100 INJECTION, SOLUTION INTRAVENOUS; SUBCUTANEOUS at 11:12

## 2017-07-20 RX ADMIN — OMEPRAZOLE 20 MG: 20 CAPSULE, DELAYED RELEASE ORAL at 09:00

## 2017-07-20 RX ADMIN — METOPROLOL TARTRATE 25 MG: 25 TABLET, FILM COATED ORAL at 09:00

## 2017-07-20 RX ADMIN — GUAIFENESIN 600 MG: 600 TABLET, EXTENDED RELEASE ORAL at 09:00

## 2017-07-20 RX ADMIN — HEPARIN SODIUM 5000 UNITS: 5000 INJECTION, SOLUTION INTRAVENOUS; SUBCUTANEOUS at 06:03

## 2017-07-20 RX ADMIN — DOXYCYCLINE 100 MG: 100 TABLET ORAL at 09:00

## 2017-07-20 RX ADMIN — STANDARDIZED SENNA CONCENTRATE AND DOCUSATE SODIUM 2 TABLET: 8.6; 5 TABLET, FILM COATED ORAL at 09:01

## 2017-07-20 RX ADMIN — ASPIRIN 325 MG: 325 TABLET, DELAYED RELEASE ORAL at 09:00

## 2017-07-20 RX ADMIN — SILDENAFIL 20 MG: 20 TABLET ORAL at 09:00

## 2017-07-20 ASSESSMENT — COGNITIVE AND FUNCTIONAL STATUS - GENERAL
CLIMB 3 TO 5 STEPS WITH RAILING: A LITTLE
SUGGESTED CMS G CODE MODIFIER MOBILITY: CI
MOBILITY SCORE: 23

## 2017-07-20 ASSESSMENT — GAIT ASSESSMENTS
GAIT LEVEL OF ASSIST: STAND BY ASSIST
DISTANCE (FEET): 200

## 2017-07-20 ASSESSMENT — PAIN SCALES - GENERAL: PAINLEVEL_OUTOF10: 5

## 2017-07-20 NOTE — CARE PLAN
Problem: Safety  Goal: Will remain free from falls  Intervention: Assess risk factors for falls  Educated patient on use of call light, no slip socks on, bed lowest position. All needs attended to. Patient verbalized understanding.           Problem: Pain Management  Goal: Pain level will decrease to patient’s comfort goal  Intervention: Follow pain managment plan developed in collaboration with patient and Interdisciplinary Team  Listened to patients discussion of pain. Discussed with patient pain goal and management through medications. Supported and educated patient by addressing specific questions regarding diagnosis, risks, benefits of pain management treatment.

## 2017-07-20 NOTE — PROGRESS NOTES
Discharge Outreach - patient still admitted to hospital -  notified to reschedule dc clinic appts.

## 2017-07-20 NOTE — PROGRESS NOTES
Bedside report received. Patient A&O X 3, tele- SR,  RAL NC,  Voids- Bathroom, Activity- independent, Diet- Cardiac/Renal. Complains of pain 8/10 medicated per MAR. POC discussed with patient. Pt verbalized understanding. Call light and belongings with in reach.  Bed locked and in lowest position, alarm and fall precautions in place.

## 2017-07-20 NOTE — THERAPY
"Physical Therapy Evaluation completed.   Bed Mobility:  Supine to Sit: Supervised  Transfers: Sit to Stand: Supervised  Gait: Level Of Assist: Stand by Assist with No Equipment Needed       Plan of Care: Patient with no further skilled PT needs in the acute care setting at this time  Discharge Recommendations: Equipment: No Equipment Needed. Post-acute therapy Currently anticipate no further skilled therapy needs once patient is discharged from the inpatient setting.    See \"Rehab Therapy-Acute\" Patient Summary Report for complete documentation.     "

## 2017-07-20 NOTE — DISCHARGE SUMMARY
HOSPITAL MEDICINE DISCHARGE SUMMARY    Name: Joanne Yepez  MRN: [id number]  : 1945  Admit Date: 2017  Discharge Date: 2017  Attending Provider: Myron Singh M.D.  Primary Care Physician: Pcp Not In Computer    CHIEF COMPLAINT  Chief Complaint   Patient presents with   • Shortness of Breath     BIB EMS for SOB with exertion x 2 days. BLE pitting edema noted.        CODE STATUS  Full Code    DISCHARGE DIAGNOSES WITH THEIR RESPECTIVE HOSPITAL COURSE, PLAN AND FOLLOW-UP  Please review Dr. Ham Escudero M.D. notes for further details of history of present illness, past medical/social/family histories, allergies and medications.    * Acute diastolic heart failure (CMS-[facility])  Assessment & Plan  Lasix, metoprolol, close watch, unclear why she takes sildenafil, last echo showed pulm pressure of 39. Improved now. Not needing oxygen.  Holding of Lasix now because of worsening creatinine.  Creatinine improved now. I will hold off giving any further diuretic as she is euvolemic.  Follow up to discharge clinic in 1 week. Reassess if diuretics need to be given at some point as currently she went into acute kidney injury from overdiuresis. BMP can be obtained at that visit.  Follow up with her primary care physician in one week to reassess. BMP can be obtained at that visit. Referral to Cardiology will be deferred to them as is elective stress testing.    Acute kidney injury (CMS-[facility])  Assessment & Plan  Mild, likely from diuresis. Hold off Lasix now and trend creatinine. Get renal U/S to r/o obstruction. That came back no hydronephrosis or inflammatory markings.  After holding off Lasix, Cr- has trended down to near normal levels.  Hold off any further diuresis as she is close to dry side of euvolemia.  Follow up to discharge clinic in 1 week. Reassess if diuretics need to be given at some point as currently she went into acute kidney injury from overdiuresis. BMP can be obtained at that  visit.  Follow up with her primary care physician in one week to reassess. BMP can be obtained at that visit.     Hypertension (present on admission)  Assessment & Plan  Norvasc, metoprolol. Stable. Continue.  Recommend her having to keep a journal of blood pressures obtained at home twice a day for review with primary care.  Follow up to discharge clinic in 1 week. BMP can be obtained at that visit.  Follow up with her primary care physician in one week to reassess. BMP can be obtained at that visit.     Diabetes mellitus type II, controlled (CMS-HCC) (present on admission)  Assessment & Plan  Lantus, ISS  Levimir reconciled and will give scripts for.  Follow up to discharge clinic in 1 week.   Follow up with her primary care physician in one week to reassess.    COPD (chronic obstructive pulmonary disease) (CMS-HCC) (present on admission)  Assessment & Plan  Doxycycline, mucinex, antitussives, hold steroids, RT/O2  Improved. Off oxygen.    Dyslipidemia  Assessment & Plan  Statin      DISCHARGE PHYSICAL EXAM      Physical Exam   Constitutional: She appears well-developed and well-nourished.   HENT:    Head: Normocephalic and atraumatic.   Eyes: Conjunctivae and EOM are normal. No scleral icterus.   Neck: Normal range of motion. Neck supple. JVD resolved.   Cardiovascular: Normal rate and regular rhythm.  Exam reveals no gallop and no friction rub.     No murmur heard.  Pulmonary/Chest: Effort normal and breath sounds normal. No respiratory distress. She has no wheezes. She has no rales.   Abdominal: Soft. Bowel sounds are normal. She exhibits no distension. There is no tenderness. There is no rebound and no guarding.   Musculoskeletal: She exhibits edema, resolving. She exhibits no tenderness.   Neurological: She is alert.   Skin: Skin is warm.   Psychiatric: She has a normal mood and affect. Her behavior is normal.      PT/nursing staff evaluated her ambulation and felt she can go home.    Joanne Rios  Marleni improved and was deemed ready to be discharged from the hospital as there were no further inpatient needs. Joanne Yepez felt comfortable going home. The discharge plan was discussed with Joanne Yepez, and agreed to it. Joanne Yepez was subsequently discharged in improved and stable condition.    DISCHARGE MEDICATIONS:    Joanne Yepez   Home Medication Instructions EMILY:[id number]    Printed on:07/20/17 3962   Medication Information                      acetaminophen (TYLENOL) 325 MG Tab  Take 2 Tabs by mouth every four hours as needed for Fever (Temperature greater than 101.5 F).             amlodipine (NORVASC) 5 MG Tab  Take 5 mg by mouth every day.             aspirin EC (ECOTRIN) 81 MG Tablet Delayed Response  Take 81 mg by mouth every day.             atorvastatin (LIPITOR) 40 MG Tab  Take 40 mg by mouth every evening.             doxycycline monohydrate (ADOXA) 100 MG tablet  Take 1 Tab by mouth every 12 hours for 1 day.             furosemide (LASIX) 40 MG Tab  Take 1 Tab by mouth every day.             guaiFENesin (ROBITUSSIN) 100 MG/5ML Solution  Take 10 mL by mouth every four hours as needed for Cough.             guaifenesin LA (MUCINEX) 600 MG TABLET SR 12 HR  Take 1 Tab by mouth every 12 hours.             insulin detemir (LEVEMIR) 100 UNIT/ML Solution  Inject 30 Units as instructed every evening.             insulin lispro (HUMALOG) 100 UNIT/ML Solution  Inject 5 Units as instructed 3 times a day before meals.             metoprolol (LOPRESSOR) 25 MG Tab  Take 25 mg by mouth 2 times a day.             omeprazole (PRILOSEC) 20 MG delayed-release capsule  Take 1 Cap by mouth every day.             potassium chloride SA (K-DUR) 10 MEQ Tab CR  Take 1 Tab by mouth every day.             senna-docusate (PERICOLACE OR SENOKOT S) 8.6-50 MG Tab  Take 2 Tabs by mouth 2 Times a Day.             sildenafil (REVATIO) 20 MG tablet  Take  20 mg by mouth 2 Times a Day.                 DISCHARGE VITALS, LABS and IMAGING  Filed Vitals:    07/19/17 2000 07/20/17 0000 07/20/17 0400 07/20/17 0740   BP: 143/70 121/63 139/76 145/68   Pulse: 82 82 69 82   Temp: 36.4 °C (97.5 °F) 37.4 °C (99.4 °F) 36.9 °C (98.4 °F) 36.8 °C (98.3 °F)   Resp: 16 16 16 16   Height:       Weight: 75.2 kg (165 lb 12.6 oz)      SpO2: 97% 95% 94% 95%     Lab Results   Component Value Date/Time    WBC 11.1* 07/19/2017 02:27 AM    RBC 3.81* 07/19/2017 02:27 AM    HEMOGLOBIN 10.7* 07/19/2017 02:27 AM    HEMATOCRIT 32.6* 07/19/2017 02:27 AM    MCV 85.6 07/19/2017 02:27 AM    MCH 28.1 07/19/2017 02:27 AM    MCHC 32.8* 07/19/2017 02:27 AM    MPV 12.9 07/19/2017 02:27 AM    NEUTROPHILS-POLYS 71.10 07/17/2017 10:56 AM    LYMPHOCYTES 19.50* 07/17/2017 10:56 AM    MONOCYTES 7.90 07/17/2017 10:56 AM    EOSINOPHILS 0.80 07/17/2017 10:56 AM    BASOPHILS 0.40 07/17/2017 10:56 AM    HYPOCHROMIA 1+ 04/11/2013 03:26 AM      Lab Results   Component Value Date/Time    SODIUM 136 07/20/2017 10:00 AM    POTASSIUM 4.1 07/20/2017 10:00 AM    CHLORIDE 103 07/20/2017 10:00 AM    CO2 22 07/20/2017 10:00 AM    GLUCOSE 163* 07/20/2017 10:00 AM    BUN 37* 07/20/2017 10:00 AM    CREATININE 1.51* 07/20/2017 10:00 AM      Lab Results   Component Value Date/Time    PT 12.7 07/17/2017 09:29 AM    INR 0.93 07/17/2017 09:29 AM        Dx-chest-portable (1 View)    7/19/2017 7/19/2017 4:46 PM HISTORY/REASON FOR EXAM:  Shortness of Breath TECHNIQUE/EXAM DESCRIPTION AND NUMBER OF VIEWS: Single portable view of the chest. COMPARISON:  7/17/2017 FINDINGS: Mild cardiomegaly stable. There is diffuse prominence of the pulmonary interstitium. This may represent mild interstitial edema, pneumonia, or fibrotic change. No lobar consolidation is present. No pleural effusion is noted.     7/19/2017  There is diffuse increased interstitial opacity which may represent interstitial edema or pneumonia. No lobar consolidation is  present.    Dx-chest-portable (1 View)    7/17/2017 7/17/2017 10:35 AM HISTORY/REASON FOR EXAM:  Shortness of breath with exertion for 2 days TECHNIQUE/EXAM DESCRIPTION AND NUMBER OF VIEWS: Single portable view of the chest. COMPARISON: 04/15/2016 FINDINGS: Appearance of cardiac silhouette is unchanged compared to prior exam. No new infiltrates or consolidations appear to have developed since the prior exam. Ill-defined opacifications centered in the perihilar regions of the lungs appear to have increased compared to prior exam. No new pleural fluid collections or pneumothorax appear to have developed since the prior radiograph.     7/17/2017  1.  There is mild cardiomegaly. 2.  Mild poorly defined perihilar congestive changes are noted which could be due to edema or inflammation. 3.  No consolidations identified.    Us-renal    7/19/2017 7/19/2017 9:59 AM HISTORY/REASON FOR EXAM:  Abnormal Labs TECHNIQUE/EXAM DESCRIPTION: Renal ultrasound. COMPARISON:  None FINDINGS: The right kidney measures 10.28 cm. The left kidney measures 10.78 cm. There is no hydronephrosis. There are no abnormal calcifications. The bladder demonstrates no focal wall abnormality. Right ureteral jet is visible.     7/19/2017  1.  Normal renal ultrasound.    Echocardiogram Comp W/o Cont    7/18/2017  Transthoracic Echo Report Echocardiography Laboratory CONCLUSIONS Left ventricular ejection fraction is visually estimated to be 55%. Normal regional wall motion. Grade II diastolic dysfunction. Mild mitral regurgitation. Mild aortic insufficiency. Estimated right ventricular systolic pressure  is 30 mmHg. Right atrial pressure is estimated to be 3 mmHg. Compared to the images of the prior study done 4/12/16 there has been no significant change. BRO WHALEY Exam Date:         07/18/2017                    11:19 Exam Location:     Inpatient Priority:          Routine Ordering Physician:        SANTIAGO GONZALEZ Referring Physician:  Sonographer:               Ambrose Randolph RDCS,                            RVT Age:    72     Gender:    F MRN:    [id number] :    1945 BSA:    1.73   Ht (in):    61     Wt (lb):    163 Exam Type:     Complete Indications:     Abnormal electrocardiogram ECG EKG ICD Codes:        CPT Codes:       55926 BP:   135    /   51     HR: Technical Quality:       Good MEASUREMENTS  (Male / Female) Normal Values 2D ECHO LVOT Diameter                     1.6 cm                LV Ejection Fraction MOD BP       64.2 %                >= 55  % LV Ejection Fraction MOD 4C       71.9 %                LV Ejection Fraction MOD 2C       40.2 %                M-MODE LV Diastolic Diameter MM          5.9 cm                4.2 - 5.9 / 3.9 - 5.3 cm LV Systolic Diameter MM           3.6 cm                IVS Diastolic Thickness MM        0.62 cm               0.6 - 1.0 / 0.6 - 0.9 cm LVPW Diastolic Thickness MM       0.91 cm               0.6 - 1.0 / 0.6 - 0.9 cm DOPPLER AV Peak Velocity                  1.2 m/s               AV Peak Gradient                  5.9 mmHg              AV Mean Gradient                  3.6 mmHg              LVOT Peak Velocity                0.86 m/s              AV Area Cont Eq vti               1.4 cm²               Mitral E Point Velocity           1.1 m/s               Mitral E to A Ratio               1.4                   MV Pressure Half Time             61.2 ms               MV Area PHT                       3.6 cm²               MV Deceleration Time              211 ms                TR Peak Velocity                  265 cm/s              PV Peak Velocity                  1.2 m/s               PV Peak Gradient                  5.5 mmHg              * Indicates values subject to auto-interpretation LV EF:        % FINDINGS Left Ventricle Normal left ventricular chamber size. Mild concentric left ventricular hypertrophy. Normal left ventricular systolic function. Left ventricular ejection fraction  is visually estimated to be 55%. Normal regional wall motion. Grade II diastolic dysfunction. Right Ventricle Normal right ventricular size. Right Atrium Normal right atrial size. Normal inferior vena cava size and inspiratory collapse. Left Atrium Mildly dilated left atrium. Left atrial volume index is 34 mL/sq m. Mitral Valve Mitral annular calcification. Mild mitral regurgitation. Aortic Valve Aortic valve trileaflet. Mild aortic insufficiency. No aortic stenosis. Tricuspid Valve Structurally normal tricuspid valve. Mild tricuspid regurgitation. Estimated right ventricular systolic pressure  is 30 mmHg. Right atrial pressure is estimated to be 3 mmHg. Pulmonic Valve Structurally normal pulmonic valve. Mild to moderate pulmonic insufficiency. Pericardium Normal pericardium without effusion. Aorta Normal aortic root for body surface area. Ascending aorta diameter is 3 cm. Mahogany Guerin M.D. (Electronically Signed) Final Date:     18 July 2017                 16:38      Please see discharge diagnoses, plan and follow up above for details of pending tests and postdischarge instructions for the clinic providers and specialists.    Please CC Please fax to her primary care provider in [place] and to the discharge clinic physician.    For further details on discharge medications, patient education, diet, and activity, please refer to electronic copy of discharge instructions.       TIME SPENT: 45 minutes, with greater than 50% of the time spent on face-to-face encounter, addressing medical issues, coordination of care, counseling, discharge planning, medication reconciliation, and documentation.

## 2017-07-20 NOTE — PROGRESS NOTES
Report received at bedside, assumed care. Pt is ambulating room at this time. A&O x 4. Pain 0/10. Educated patient about plan of care. No other concerns, complains or distress. Tele box on. Chart reviewed. Bed in lowest position, treaded slipper sock on, and call light within reach.

## 2017-07-20 NOTE — DISCHARGE INSTRUCTIONS
Discharge Instructions    Discharged to home by car with relative. Discharged via wheelchair, hospital escort: Yes.  Special equipment needed: Not Applicable    Be sure to schedule a follow-up appointment with your primary care doctor or any specialists as instructed.     Discharge Plan:   Influenza Vaccine Indication: Patient Refuses (outside flu season)    I understand that a diet low in cholesterol, fat, and sodium is recommended for good health. Unless I have been given specific instructions below for another diet, I accept this instruction as my diet prescription.   Other diet: Renal/Diabetic    Special Instructions: Follow up with her primary provider at [place] in 2 weeks. Defer further workup for chronic R sided axillary pain.  Follow up with Heart Failure appointment July 24th        HF Patient Discharge Instructions  · Monitor your weight daily, and maintain a weight chart, to track your weight changes.   · Activity as tolerated, unless your Doctor has ordered otherwise. Other activity order: as tolerated.  · Follow a low fat, low cholesterol, low salt diet unless instructed otherwise by your Doctor. Read the labels on the back of food products and track your intake of fat, cholesterol and salt.   · Fluid Restriction Yes. If a Fluid Restriction has been ordered by your Doctor, measure fluids with a measuring cup to ensure that you are not exceeding the restriction.   · No smoking.  · Oxygen No. If your Doctor has ordered that you wear Oxygen at home, it is important to wear it as ordered.  · Did you receive an explanation from staff on the importance of taking each of your medications and why it is necessary to keep taking them unless your doctor says to stop? Yes  · Were all of your questions answered about how to manage your heart failure and what to do if you have increased signs and symptoms after you go home? Yes  · Do you feel like your heart failure care team involved you in the care treatment  plan and allowed you to make decisions regarding your care while in the hospital and addressed any discharge needs you might have? Yes    See the educational handout provided at discharge for more information on monitoring your daily weight, activity and diet. This also explains more about Heart Failure, symptoms of a flare-up and some of the tests that you have undergone.     Warning Signs of a Flare-Up include:  · Swelling in the ankles or lower legs.  · Shortness of breath, while at rest, or while doing normal activities.   · Shortness of breath at night when in bed, or coughing in bed.   · Requiring more pillows to sleep at night, or needing to sit up at night to sleep.  · Feeling weak, dizzy or fatigued.     When to call your Doctor:  · Call [facility] seven days a week from 8:00 a.m. to 8:00 p.m. for medical questions [phone].  · Call your Primary Care Physician or Cardiologist if:   1. You experience any pain radiating to your jaw or neck.  2. You have any difficulty breathing.  3. You experience weight gain of 3 lbs in a day or 5 lbs in a week.   4. You feel any palpitations or irregular heartbeats.  5. You become dizzy or lose consciousness.   If you have had an angiogram or had a pacemaker or AICD placed, and experience:  1. Bleeding, drainage or swelling at the surgical / puncture site.  2. Fever greater than 100.0 F  3. Shock from internal defibrillator.  4. Cool and / or numb extremities.      · Is patient discharged on Warfarin / Coumadin?   No     · Is patient Post Blood Transfusion?  No    Depression / Suicide Risk    As you are discharged from this [facility], it is important to learn how to keep safe from harming yourself.    Recognize the warning signs:  · Abrupt changes in personality, positive or negative- including increase in energy   · Giving away possessions  · Change in eating patterns- significant weight changes-  positive or negative  · Change in sleeping  patterns- unable to sleep or sleeping all the time   · Unwillingness or inability to communicate  · Depression  · Unusual sadness, discouragement and loneliness  · Talk of wanting to die  · Neglect of personal appearance   · Rebelliousness- reckless behavior  · Withdrawal from people/activities they love  · Confusion- inability to concentrate     If you or a loved one observes any of these behaviors or has concerns about self-harm, here's what you can do:  · Talk about it- your feelings and reasons for harming yourself  · Remove any means that you might use to hurt yourself (examples: pills, rope, extension cords, firearm)  · Get professional help from the community (Mental Health, Substance Abuse, psychological counseling)  · Do not be alone:Call your Safe Contact- someone whom you trust who will be there for you.  · Call your local CRISIS HOTLINE 697-4578 or 766-671-8388  · Call your local Children's Mobile Crisis Response Team [place] (621) 716-2392 or www.[employer]  · Call the toll free National Suicide Prevention Hotlines   · National Suicide Prevention Lifeline 613-855-AZDG (1282)  · National Hope Line Network 800-SUICIDE (121-5503)

## 2017-07-20 NOTE — DOCUMENTATION QUERY
DOCUMENTATION QUERY    PROVIDERS: Please select “Cosign w/ note”to reply to query.    To better represent the severity of illness of your patient, please review the following information and exercise your independent professional judgment in responding to this query.     A low sodium level is noted in the Lab Results. Based upon the clinical findings, risk factors, and treatment, can a diagnosis be provided to support this finding?    • Hyponatremia  • Findings of no clinical significance   • Other explanation of clinical findings  • Unable to determine (no explanation for clinical findings)    The medical record reflects the following:   Clinical Findings Na 130/136/132/129/130/136   Treatment IV NS & monitor lab results   Risk Factors Age, acute on chronic diastolic heart failure, & acute kidney injury   Location within medical record History & Physical, Progress Notes, Discharge Summary, & Lab Results      Thank you,   Regina Killian RN  Clinical   Phone [phone]

## 2017-07-21 ENCOUNTER — PATIENT OUTREACH (OUTPATIENT)
Dept: HEALTH INFORMATION MANAGEMENT | Facility: OTHER | Age: 72
End: 2017-07-21

## 2017-07-21 NOTE — PROGRESS NOTES
07/21/2017 1000 - Discharge Outreach attempt - LM on home number. LM on daughter's phone also.  07/21/2017 1316 - Discharge Outreach attempt - LM  07/22/2017 1006 - Discharge Outreach attempt - patient not home - will be back in an hour or so. Will call back later.   07/22/2017 1159 - Discharge Outreach - called patient back and call completed via .

## 2017-07-24 ENCOUNTER — HOSPITAL ENCOUNTER (OUTPATIENT)
Dept: LAB | Facility: MEDICAL CENTER | Age: 72
End: 2017-07-24
Attending: NURSE PRACTITIONER
Payer: MEDICARE

## 2017-07-24 ENCOUNTER — OFFICE VISIT (OUTPATIENT)
Dept: MEDICAL GROUP | Facility: CLINIC | Age: 72
End: 2017-07-24
Payer: MEDICARE

## 2017-07-24 VITALS
OXYGEN SATURATION: 94 % | TEMPERATURE: 98.1 F | SYSTOLIC BLOOD PRESSURE: 120 MMHG | DIASTOLIC BLOOD PRESSURE: 60 MMHG | HEIGHT: 65 IN | WEIGHT: 166 LBS | RESPIRATION RATE: 16 BRPM | BODY MASS INDEX: 27.66 KG/M2 | HEART RATE: 70 BPM

## 2017-07-24 DIAGNOSIS — N17.9 ACUTE KIDNEY INJURY (HCC): ICD-10-CM

## 2017-07-24 DIAGNOSIS — E11.69 DM TYPE 2 WITH DIABETIC DYSLIPIDEMIA (HCC): ICD-10-CM

## 2017-07-24 DIAGNOSIS — Z09 HOSPITAL DISCHARGE FOLLOW-UP: ICD-10-CM

## 2017-07-24 DIAGNOSIS — I50.31 ACUTE DIASTOLIC HEART FAILURE (HCC): ICD-10-CM

## 2017-07-24 DIAGNOSIS — E78.5 DM TYPE 2 WITH DIABETIC DYSLIPIDEMIA (HCC): ICD-10-CM

## 2017-07-24 DIAGNOSIS — E78.1 HYPERTRIGLYCERIDEMIA: ICD-10-CM

## 2017-07-24 DIAGNOSIS — J44.9 CHRONIC OBSTRUCTIVE PULMONARY DISEASE, UNSPECIFIED COPD TYPE (HCC): ICD-10-CM

## 2017-07-24 DIAGNOSIS — R07.1 CHEST PAIN ON BREATHING: ICD-10-CM

## 2017-07-24 LAB
ANION GAP SERPL CALC-SCNC: 10 MMOL/L (ref 0–11.9)
BASOPHILS # BLD AUTO: 0.5 % (ref 0–1.8)
BASOPHILS # BLD: 0.05 K/UL (ref 0–0.12)
BUN SERPL-MCNC: 49 MG/DL (ref 8–22)
CALCIUM SERPL-MCNC: 8.9 MG/DL (ref 8.5–10.5)
CHLORIDE SERPL-SCNC: 102 MMOL/L (ref 96–112)
CO2 SERPL-SCNC: 20 MMOL/L (ref 20–33)
CREAT SERPL-MCNC: 1.67 MG/DL (ref 0.5–1.4)
DEPRECATED D DIMER PPP IA-ACNC: 391 NG/ML(D-DU)
EOSINOPHIL # BLD AUTO: 0.13 K/UL (ref 0–0.51)
EOSINOPHIL NFR BLD: 1.2 % (ref 0–6.9)
ERYTHROCYTE [DISTWIDTH] IN BLOOD BY AUTOMATED COUNT: 39.1 FL (ref 35.9–50)
EST. AVERAGE GLUCOSE BLD GHB EST-MCNC: 286 MG/DL
GFR SERPL CREATININE-BSD FRML MDRD: 30 ML/MIN/1.73 M 2
GLUCOSE SERPL-MCNC: 303 MG/DL (ref 65–99)
HBA1C MFR BLD: 11.6 % (ref 0–5.6)
HCT VFR BLD AUTO: 37 % (ref 37–47)
HGB BLD-MCNC: 11.9 G/DL (ref 12–16)
IMM GRANULOCYTES # BLD AUTO: 0.05 K/UL (ref 0–0.11)
IMM GRANULOCYTES NFR BLD AUTO: 0.5 % (ref 0–0.9)
LYMPHOCYTES # BLD AUTO: 2.24 K/UL (ref 1–4.8)
LYMPHOCYTES NFR BLD: 21.5 % (ref 22–41)
MCH RBC QN AUTO: 27.7 PG (ref 27–33)
MCHC RBC AUTO-ENTMCNC: 32.2 G/DL (ref 33.6–35)
MCV RBC AUTO: 86.2 FL (ref 81.4–97.8)
MONOCYTES # BLD AUTO: 0.75 K/UL (ref 0–0.85)
MONOCYTES NFR BLD AUTO: 7.2 % (ref 0–13.4)
NEUTROPHILS # BLD AUTO: 7.22 K/UL (ref 2–7.15)
NEUTROPHILS NFR BLD: 69.1 % (ref 44–72)
NRBC # BLD AUTO: 0 K/UL
NRBC BLD AUTO-RTO: 0 /100 WBC
PLATELET # BLD AUTO: 252 K/UL (ref 164–446)
PMV BLD AUTO: 13.3 FL (ref 9–12.9)
POTASSIUM SERPL-SCNC: 4.4 MMOL/L (ref 3.6–5.5)
RBC # BLD AUTO: 4.29 M/UL (ref 4.2–5.4)
SODIUM SERPL-SCNC: 132 MMOL/L (ref 135–145)
TROPONIN I SERPL-MCNC: 0.02 NG/ML (ref 0–0.04)
WBC # BLD AUTO: 10.4 K/UL (ref 4.8–10.8)

## 2017-07-24 PROCEDURE — 93000 ELECTROCARDIOGRAM COMPLETE: CPT | Performed by: NURSE PRACTITIONER

## 2017-07-24 PROCEDURE — 36415 COLL VENOUS BLD VENIPUNCTURE: CPT

## 2017-07-24 PROCEDURE — 99496 TRANSJ CARE MGMT HIGH F2F 7D: CPT | Mod: 25 | Performed by: NURSE PRACTITIONER

## 2017-07-24 PROCEDURE — 84484 ASSAY OF TROPONIN QUANT: CPT

## 2017-07-24 PROCEDURE — 85379 FIBRIN DEGRADATION QUANT: CPT

## 2017-07-24 PROCEDURE — 80048 BASIC METABOLIC PNL TOTAL CA: CPT

## 2017-07-24 PROCEDURE — 83036 HEMOGLOBIN GLYCOSYLATED A1C: CPT | Mod: GA

## 2017-07-24 PROCEDURE — 85025 COMPLETE CBC W/AUTO DIFF WBC: CPT

## 2017-07-24 RX ORDER — AZITHROMYCIN 250 MG/1
TABLET, FILM COATED ORAL
Qty: 6 TAB | Refills: 0 | Status: SHIPPED | OUTPATIENT
Start: 2017-07-24

## 2017-07-24 RX ORDER — OMEPRAZOLE 20 MG/1
20 CAPSULE, DELAYED RELEASE ORAL 2 TIMES DAILY
Qty: 60 CAP | Refills: 3 | Status: SHIPPED | OUTPATIENT
Start: 2017-07-24

## 2017-07-24 ASSESSMENT — ENCOUNTER SYMPTOMS
ABDOMINAL PAIN: 0
FEVER: 0
PALPITATIONS: 0
VOMITING: 0
DIAPHORESIS: 0
CHILLS: 0
NAUSEA: 0
WEAKNESS: 0

## 2017-07-24 ASSESSMENT — PATIENT HEALTH QUESTIONNAIRE - PHQ9: CLINICAL INTERPRETATION OF PHQ2 SCORE: 0

## 2017-07-24 NOTE — ASSESSMENT & PLAN NOTE
She reports that she woke up last night due to chest pain and also shaking. She did check her own blood sugar which was high, 340. She has been using levemir 30 units every evening and humalog 5 units three times per day. Her sugar seems to have poor control recently.    Pt has PCP in [place] and she is only visiting here. She is here on 7/12 and planned to go back in August.

## 2017-07-24 NOTE — MR AVS SNAPSHOT
"        Joanne Yepez   2017 2:20 PM   Office Visit   MRN: [id number]    Department:  [facility]   Dept Phone:  [phone]    Description:  Female : 1945   Provider:  FADY Yepez           Reason for Visit     Hospital Follow-up BLE swelling/ chest tightness/dry cough/ feeling weak x 1 day       Allergies as of 2017     No Known Allergies      You were diagnosed with     Hospital discharge follow-up   [847287]       Acute diastolic heart failure (CMS-HCC)   [428.31.ICD-9-CM]       Acute kidney injury (CMS-HCC)   [719240]       Chronic obstructive pulmonary disease, unspecified COPD type (CMS-HCC)   [2013660]       DM type 2 with diabetic dyslipidemia (CMS-HCC)   [152651]       Hypertriglyceridemia   [669993]       Chest pain on breathing   [151887]         Vital Signs     Blood Pressure Pulse Temperature Respirations Height Weight    120/60 mmHg 70 36.7 °C (98.1 °F) 16 1.651 m (5' 5\") 75.297 kg (166 lb)    Body Mass Index Oxygen Saturation Smoking Status             27.62 kg/m2 94% Former Smoker         Basic Information     Date Of Birth Sex Race Ethnicity Preferred Language    1945 Female White  Origin (Sinhala,[place],Citizen of [place], etc) Sinhala      Your appointments     Aug 15, 2017 10:15 AM   Heart Failure New with Huong Jones M.D.   [facility] (--)    [address]  [place] [address]   [phone]              Problem List              ICD-10-CM Priority Class Noted - Resolved    LBBB (left bundle branch block) I44.7   2012 - Present    History of pulmonary edema Z87.09   2012 - Present    Hypertension I10 Medium  2012 - Present    Chronic combined systolic and diastolic heart failure (CMS-HCC) I50.42   2013 - Present    Diabetes mellitus type II, controlled (CMS-HCC) E11.9 Medium  2013 - Present    DM type 2 with diabetic dyslipidemia (CMS-[facility]) E11.69, " E78.5   1/7/2013 - Present    Congestive heart failure (CMS-HCC) I50.9   12/24/2014 - Present    COPD (chronic obstructive pulmonary disease) (CMS-HCC) J44.9 Medium  8/14/2015 - Present    Hypertriglyceridemia E78.1 Medium  8/14/2015 - Present    Acute diastolic heart failure (CMS-HCC) I50.31 High  7/17/2017 - Present    Dyslipidemia E78.5   7/17/2017 - Present    Acute kidney injury (CMS-HCC) N17.9 High  7/19/2017 - Present      Health Maintenance        Date Due Completion Dates    DIABETES MONOFILAMENT / LE EXAM 1945 ---    RETINAL SCREENING 2/25/1962 ---    URINE ACR / MICROALBUMIN 2/25/1962 ---    IMM DTaP/Tdap/Td Vaccine (1 - Tdap) 2/25/1963 ---    PAP SMEAR 2/25/1965 ---    MAMMOGRAM 2/25/1984 ---    COLONOSCOPY 2/25/1994 ---    IMM ZOSTER VACCINE 2/25/2004 ---    BONE DENSITY 2/25/2009 ---    A1C SCREENING 10/10/2013 4/10/2013, 11/20/2007    FASTING LIPID PROFILE 12/28/2013 12/28/2012    IMM INFLUENZA (1) 9/1/2017 12/25/2014, 11/28/2012    IMM PNEUMOCOCCAL 65+ (ADULT) LOW/MEDIUM RISK SERIES (2 of 2 - PCV13) 7/10/2018 7/10/2017, 11/1/2009    SERUM CREATININE 7/20/2018 7/20/2017, 7/20/2017, 7/19/2017, 7/19/2017, 7/18/2017, 7/17/2017, 4/15/2016, 4/14/2016, 4/13/2016, 4/12/2016, 9/4/2015, 5/22/2015, 5/15/2015, 12/27/2014, 12/26/2014, 12/24/2014, 4/22/2013, 4/12/2013, 4/11/2013, 4/10/2013, 12/30/2012, 12/29/2012, 12/28/2012, 12/27/2012, 9/23/2008, 11/28/2007, 11/27/2007, 11/26/2007, 11/24/2007, 11/20/2007, 2/4/2007            Results     EKG - Clinic Performed                   Current Immunizations     Influenza TIV (IM) 11/28/2012    Influenza Vaccine Adult HD 12/25/2014  4:12 AM    Pneumococcal polysaccharide vaccine (PPSV-23) 7/10/2017, 11/1/2009      Below and/or attached are the medications your provider expects you to take. Review all of your home medications and newly ordered medications with your provider and/or pharmacist. Follow medication instructions as directed by your provider and/or  pharmacist. Please keep your medication list with you and share with your provider. Update the information when medications are discontinued, doses are changed, or new medications (including over-the-counter products) are added; and carry medication information at all times in the event of emergency situations     Allergies:  No Known Allergies          Medications  Valid as of: July 24, 2017 -  5:31 PM    Generic Name Brand Name Tablet Size Instructions for use    Acetaminophen (Tab) TYLENOL 325 MG Take 2 Tabs by mouth every four hours as needed for Fever (Temperature greater than 101.5 F).        AmLODIPine Besylate (Tab) NORVASC 5 MG Take 5 mg by mouth every day.        Aspirin (Tablet Delayed Response) ECOTRIN 81 MG Take 81 mg by mouth every day.        Atorvastatin Calcium (Tab) LIPITOR 40 MG Take 40 mg by mouth every evening.        Azithromycin (Tab) ZITHROMAX 250 MG AS DIRECTED        Furosemide (Tab) LASIX 40 MG Take 1 Tab by mouth every day.        GuaiFENesin (Solution) ROBITUSSIN 100 MG/5ML Take 10 mL by mouth every four hours as needed for Cough.        GuaiFENesin (TABLET SR 12 HR) MUCINEX 600 MG Take 1 Tab by mouth every 12 hours.        Insulin Detemir (Solution) LEVEMIR 100 UNIT/ML Inject 32 Units as instructed every evening.        Insulin Lispro (Solution) HUMALOG 100 UNIT/ML Inject 5 Units as instructed 3 times a day before meals.        Metoprolol Tartrate (Tab) LOPRESSOR 25 MG Take 25 mg by mouth 2 times a day.        Omeprazole (CAPSULE DELAYED RELEASE) PRILOSEC 20 MG Take 1 Cap by mouth 2 times a day.        Potassium Chloride Justine CR (Tab CR) K-DUR 10 MEQ Take 1 Tab by mouth every day.        Sennosides-Docusate Sodium (Tab) PERICOLACE or SENOKOT S 8.6-50 MG Take 2 Tabs by mouth 2 Times a Day.        Sildenafil Citrate (Tab) REVATIO 20 MG Take 20 mg by mouth 2 Times a Day.        .                 Medicines prescribed today were sent to:     [facility] - [address]  [facility]    [address] [place] [place] [address]    Phone: [phone] Fax: [phone]    Open 24 Hours?: No      Medication refill instructions:       If your prescription bottle indicates you have medication refills left, it is not necessary to call your provider’s office. Please contact your pharmacy and they will refill your medication.    If your prescription bottle indicates you do not have any refills left, you may request refills at any time through one of the following ways: The online Startapp system (except Urgent Care), by calling your provider’s office, or by asking your pharmacy to contact your provider’s office with a refill request. Medication refills are processed only during regular business hours and may not be available until the next business day. Your provider may request additional information or to have a follow-up visit with you prior to refilling your medication.   *Please Note: Medication refills are assigned a new Rx number when refilled electronically. Your pharmacy may indicate that no refills were authorized even though a new prescription for the same medication is available at the pharmacy. Please request the medicine by name with the pharmacy before contacting your provider for a refill.        Your To Do List     Future Labs/Procedures Complete By Expires    BASIC METABOLIC PANEL  As directed 7/24/2018    CBC WITH DIFFERENTIAL  As directed 7/24/2018    D-DIMER  As directed 7/24/2018    HEMOGLOBIN A1C  As directed 7/24/2018    TROPONIN  As directed 7/24/2018      Instructions    If you need further assistance, or have any questions; concerns or lingering symptoms before seeing your Primary Care Provider or specialist.     Do not hesitate to contact us.     Please contact us at the Post-Hospital Follow Up Program at [phone].   Our offices hours are Monday-Friday 8 am-5 pm.                Startapp Access Code: [email or web address]  Expires: 8/19/2017  3:30 PM    Startapp  [place] secure,  online tool to manage your health information     [employer]’s ConnectEdu® is a secure, online tool that connects you to your personalized health information from the privacy of your home -- day or night - making it very easy for you to manage your healthcare. Once the activation process is completed, you can even access your medical information using the ConnectEdu andrea, which is available for free in the Apple Andrea store or Google Play store.     ConnectEdu provides the following levels of access (as shown below):   My Chart Features   [facility] Doctor [facility]  Specialists [facility]  Urgent  Care Non-[facility]  Primary Care  Doctor   Email your healthcare team securely and privately 24/7 X X X    Manage appointments: schedule your next appointment; view details of past/upcoming appointments X      Request prescription refills. X      View recent personal medical records, including lab and immunizations X X X X   View health record, including health history, allergies, medications X X X X   Read reports about your outpatient visits, procedures, consult and ER notes X X X X   See your discharge summary, which is a recap of your hospital and/or ER visit that includes your diagnosis, lab results, and care plan. X X       How to register for ConnectEdu:  1. Go to  [email or web address].  2. Click on the Sign Up Now box, which takes you to the New Member Sign Up page. You will need to provide the following information:  a. Enter your ConnectEdu Access Code exactly as it appears at the top of this page. (You will not need to use this code after you’ve completed the sign-up process. If you do not sign up before the expiration date, you must request a new code.)   b. Enter your date of birth.   c. Enter your home email address.   d. Click Submit, and follow the next screen’s instructions.  3. Create a ConnectEdu ID. This will be your ConnectEdu login ID and cannot be changed, so think of one that is secure and easy to  remember.  4. Create a [employer] password. You can change your password at any time.  5. Enter your Password Reset Question and Answer. This can be used at a later time if you forget your password.   6. Enter your e-mail address. This allows you to receive e-mail notifications when new information is available in [employer].  7. Click Sign Up. You can now view your health information.    For assistance activating your [employer] account, call [phone]

## 2017-07-24 NOTE — ASSESSMENT & PLAN NOTE
Discharged with doxycycline, antitussive. She has not picked up the medications yet. Since returning home, pt reports that she continues to have chest heaviness and sob on exertion. She has less coughing but feels more congested. She has no wheezing, no fever or chills.     7/19 cxr: interstitial edema or pneumonia

## 2017-07-24 NOTE — PROGRESS NOTES
Subjective:     Joanne Yepez is a 72 y.o. female who presents for Hospital Follow-up.  Chart reviewed. Discharge summary available for review: Yes      Date of discharge 7/20/2017. Hospitalized from 7/17 and 7/20.  48- hour post discharge RN call completed on 7/22/2017 and documented in the medical record by Celi Coppola.    HPI: Recently hospitalized for sob, BLE pitting edema, treated for acute diastolic heart failure and acute kidney injury, copd exacerbation.      COPD (chronic obstructive pulmonary disease) (CMS-HCC)  Discharged with doxycycline, antitussive. She has not picked up the medications yet. Since returning home, pt reports that she continues to have chest heaviness and sob on exertion. She has less coughing but feels more congested. She has no wheezing, no fever or chills.     7/19 cxr: interstitial edema or pneumonia    Acute diastolic heart failure (CMS-HCC)  ECHO EF 40-45% back to 2012.   7/18 ECHO Left ventricular ejection fraction is visually estimated to be 55%. Normal regional wall motion. Grade II diastolic dysfunction. Mild MR, Mild AI.   Estimated right ventricular systolic pressure  is 30 mmHg.   Compared to the images of the prior study done 4/12/16 there has been   no significant change    Lasix held due to acute kidney injury and she has resumed 40 mg daily now instead of 80 mg daily. We reviewed her echo together from 2012 to most recent one. She reports when she took 80 mg she did feel dizziness or weakness feeling she might take too much water pill. She is feeling better when she only takes 40 mg daily and she has no edema. She is euvolemic.    Since returning home, she continues to have chest pain, heaviness, tightness, not sharp pain, at mid chest. Denied burning sensation. She reports that the pain is traveling to arm or jaw. When she is in the clinic, she reports active chest pain with radiating pain to left arm but not in acute distress. She feels it is  more like dull pain.       DM type 2 with diabetic dyslipidemia  She reports that she woke up last night due to chest pain and also shaking. She did check her own blood sugar which was high, 340. She has been using levemir 30 units every evening and humalog 5 units three times per day. Her sugar seems to have poor control recently.    Pt has PCP in [place] and she is only visiting here. She is here on 7/12 and planned to go back in August.     Hypertriglyceridemia  On lipitor 40 mg nightly. Last lipid profile was back to 2012.    Acute kidney injury (CMS-HCC)  Cr. improved from 1.83 to 1.51. Last year, cr. Best one is around 1.3.      The patient does not feel weakness; no difficulty taking care of self at home.  Future appointment: 8/15 cardiologist; ?? PCP (visiting from [place]. She will see her own PCP ASAP when she back to [place].)    Patient Active Problem List    Diagnosis Date Noted   • Acute kidney injury (CMS-HCC) 07/19/2017     Priority: High   • Acute diastolic heart failure (CMS-HCC) 07/17/2017     Priority: High   • COPD (chronic obstructive pulmonary disease) (CMS-HCC) 08/14/2015     Priority: Medium   • Hypertriglyceridemia 08/14/2015     Priority: Medium   • Diabetes mellitus type II, controlled (CMS-HCC) 01/07/2013     Priority: Medium   • Hypertension 12/27/2012     Priority: Medium   • Dyslipidemia 07/17/2017   • Congestive heart failure (CMS-HCC) 12/24/2014   • Chronic combined systolic and diastolic heart failure (CMS-HCC) 01/07/2013   • DM type 2 with diabetic dyslipidemia (CMS-HCC) 01/07/2013   • LBBB (left bundle branch block) 12/27/2012   • History of pulmonary edema 12/27/2012         Allergies:   Review of patient's allergies indicates no known allergies.    Social History:  Social History   Substance Use Topics   • Smoking status: Former Smoker     Types: Cigarettes     Start date: 04/12/1974     Quit date: 04/12/2006   • Smokeless tobacco: Never Used   • Alcohol Use: No     "    ROS:  Review of Systems   Constitutional: Positive for malaise/fatigue. Negative for fever, chills and diaphoresis ( VERY SHAKING LAST NIGHT WHEN SHE HAS CHEST PAIN).   HENT: Negative for hearing loss and tinnitus.    Eyes: Negative for blurred vision and pain.   Respiratory: Positive for cough and shortness of breath (on exertion). Negative for sputum production and wheezing.    Cardiovascular: Positive for chest pain. Negative for palpitations and leg swelling.   Gastrointestinal: Negative for heartburn, nausea, vomiting and abdominal pain.   Genitourinary: Negative for dysuria, urgency and frequency.   Musculoskeletal: Negative for myalgias and falls.   Skin: Negative for rash.   Neurological: Negative for dizziness, focal weakness, weakness and headaches.   Psychiatric/Behavioral: Negative for depression. The patient is not nervous/anxious.         Objective:     Blood pressure 120/60, pulse 70, temperature 36.7 °C (98.1 °F), resp. rate 16, height 1.651 m (5' 5\"), weight 75.297 kg (166 lb), SpO2 94 %.     Physical Exam:  Physical Exam   Constitutional: She is oriented to person, place, and time and well-developed, well-nourished, and in no distress.   HENT:   Head: Normocephalic and atraumatic.   Eyes: Conjunctivae are normal.   Neck: Neck supple. No JVD present. No thyromegaly present.   Cardiovascular: Normal rate and regular rhythm.    No murmur heard.  Pulmonary/Chest: Effort normal. No respiratory distress. She has no wheezes. She has rales (at base bilaterally).   Abdominal: Soft. Bowel sounds are normal. She exhibits no distension. There is no tenderness.   Musculoskeletal: Normal range of motion. She exhibits no edema.   Neurological: She is alert and oriented to person, place, and time.   Skin: Skin is warm. No erythema.   Nursing note and vitals reviewed.        Assessment and Plan:     1. Hospital discharge follow-up  Hospitalization and results reviewed with patient. High risk conditions " requiring teaching or care coordination were identified and addressed.The patient demonstrate understanding of admission and underlying conditions. The patient understands discharge instructions and when to seek medical attention. Medications reviewed including instructions regarding high risk medications, dosing and side effects.    The patient is able to safely adhere to ADL/IADL, treatment and medication regimen, self-manage of high-risk conditions? Yes   The patient requires physical therapy/home health/DME referral? No   The patient requires referral to care coordination/behavioral health/social work?  No   Patient requires referral for pharmacy consult? No   Advance directive/POLST on file?  No   Required counseled on advance directive?  No Pt is going back to [place] in August. She is recommended to go back to see her PCP sooner if she continues to have chest pain. She would need stress test. She and her daughter verbalized understanding. Our certified MA has provided interpretation throughout the whole visit.     2. Acute diastolic heart failure (CMS-HCC)  - on lasix 40 mg daily, doing great, continues to have chest pain, EKG has no ST change, LBBB which is old. Negative troponin, outpatient stress test is recommended. Pt would not like to have it done here. She will talk to her PCP in [place].     3. Acute kidney injury (CMS-HCC)  - Improving  - BMP    4. Chronic obstructive pulmonary disease, unspecified COPD type (CMS-HCC)  - azithromycin (ZITHROMAX) 250 MG Tab; AS DIRECTED  Dispense: 6 Tab; Refill: 0  - Outpatient PFT recommended --> see PCP in [place]    5. DM type 2 with diabetic dyslipidemia (CMS-HCC)  - HEMOGLOBIN A1C; Future  - insulin detemir (LEVEMIR) 100 UNIT/ML Solution; Inject 32 Units as instructed every evening.  Dispense: 10 mL; Refill: 3    6. Hypertriglyceridemia  - on lipitor    7. Chest pain on breathing  - TROPONIN; Future  - EKG - Clinic Performed: no ST change, OLD LBBB  -  CBC WITH DIFFERENTIAL; Future  - HEMOGLOBIN A1C; Future  - BASIC METABOLIC PANEL; Future  - omeprazole (PRILOSEC) 20 MG delayed-release capsule; Take 1 Cap by mouth 2 times a day.  Dispense: 60 Cap; Refill: 3 --> increased from daily to bid to see if this is helping her own chest discomfort.   - D-DIMER; Future  - Outpatient stress test recommeneded --> pt would like to see her PCP in [place] for this. No acute distress in the clinic.       Medication Reconciliation  Medication list at end of encounter:   Current Outpatient Prescriptions   Medication Sig Dispense Refill   • omeprazole (PRILOSEC) 20 MG delayed-release capsule Take 1 Cap by mouth 2 times a day. 60 Cap 3   • azithromycin (ZITHROMAX) 250 MG Tab AS DIRECTED 6 Tab 0   • insulin detemir (LEVEMIR) 100 UNIT/ML Solution Inject 32 Units as instructed every evening. 10 mL 3   • acetaminophen (TYLENOL) 325 MG Tab Take 2 Tabs by mouth every four hours as needed for Fever (Temperature greater than 101.5 F). 30 Tab 0   • guaifenesin LA (MUCINEX) 600 MG TABLET SR 12 HR Take 1 Tab by mouth every 12 hours. 28 Tab    • senna-docusate (PERICOLACE OR SENOKOT S) 8.6-50 MG Tab Take 2 Tabs by mouth 2 Times a Day. 30 Tab 0   • amlodipine (NORVASC) 5 MG Tab Take 5 mg by mouth every day.     • sildenafil (REVATIO) 20 MG tablet Take 20 mg by mouth 2 Times a Day.     • furosemide (LASIX) 40 MG Tab Take 1 Tab by mouth every day. 30 Tab 2   • potassium chloride SA (K-DUR) 10 MEQ Tab CR Take 1 Tab by mouth every day. 30 Tab 2   • atorvastatin (LIPITOR) 40 MG Tab Take 40 mg by mouth every evening.     • aspirin EC (ECOTRIN) 81 MG Tablet Delayed Response Take 81 mg by mouth every day.     • insulin lispro (HUMALOG) 100 UNIT/ML Solution Inject 5 Units as instructed 3 times a day before meals.     • metoprolol (LOPRESSOR) 25 MG Tab Take 25 mg by mouth 2 times a day.     • guaiFENesin (ROBITUSSIN) 100 MG/5ML Solution Take 10 mL by mouth every four hours as needed for Cough. 840 mL       No current facility-administered medications for this visit.       Primary care follow-up:  New health conditions identified during hospitalization? Yes   Labs/pathology/imaging requires future PCP follow-up?  Yes outpatient PFT, STRESS TEST  Changes to medications during hospitalization or today? Yes     Recommended followup: No Follow-up on file. with Pcp Not In Computer   Future Appointments       Provider Department Center    8/15/2017 10:15 AM Huong Jones M.D. [facility]           Patient Instruction  Patient offered educational material on discharge diagnosis and management of symptoms/red flags. Patient instructed to keep follow-up appointments and to bring written questions and and actual medications to each office visit. Patient instructed to call PCP/specialist with any problems/questions/concerns. Patient verbalizes understanding and has no further questions at this time.    Face-to-face transitional care management services with HIGH complexity medical decision making.

## 2017-07-24 NOTE — ASSESSMENT & PLAN NOTE
ECHO EF 40-45% back to 2012.   7/18 ECHO Left ventricular ejection fraction is visually estimated to be 55%. Normal regional wall motion. Grade II diastolic dysfunction. Mild MR, Mild AI.   Estimated right ventricular systolic pressure  is 30 mmHg.   Compared to the images of the prior study done 4/12/16 there has been   no significant change    Lasix held due to acute kidney injury and she has resumed 40 mg daily now instead of 80 mg daily. We reviewed her echo together from 2012 to most recent one. She reports when she took 80 mg she did feel dizziness or weakness feeling she might take too much water pill. She is feeling better when she only takes 40 mg daily and she has no edema. She is euvolemic.    Since returning home, she continues to have chest pain, heaviness, tightness, not sharp pain, at mid chest. Denied burning sensation. She reports that the pain is traveling to arm or jaw. When she is in the clinic, she reports active chest pain with radiating pain to left arm but not in acute distress. She feels it is more like dull pain.

## 2017-07-24 NOTE — PATIENT INSTRUCTIONS
If you need further assistance, or have any questions; concerns or lingering symptoms before seeing your Primary Care Provider or specialist.     Do not hesitate to contact us.     Please contact us at the Post-Hospital Follow Up Program at [phone].   Our offices hours are Monday-Friday 8 am-5 pm.

## 2017-07-25 ENCOUNTER — TELEPHONE (OUTPATIENT)
Dept: MEDICAL GROUP | Facility: CLINIC | Age: 72
End: 2017-07-25

## 2017-07-25 ASSESSMENT — ENCOUNTER SYMPTOMS
SPUTUM PRODUCTION: 0
EYE PAIN: 0
FALLS: 0
SHORTNESS OF BREATH: 1
WHEEZING: 0
HEADACHES: 0
COUGH: 1
DEPRESSION: 0
HEARTBURN: 0
MYALGIAS: 0
DIZZINESS: 0
BLURRED VISION: 0
FOCAL WEAKNESS: 0
NERVOUS/ANXIOUS: 0

## 2017-07-25 NOTE — PROGRESS NOTES
POST DISCHARGE CALL MADE BY Celi Coppola  Discharge Date:7/20/2017   Date of Outreach Call: 7/22/2017 11:48 AM  Now that you're home, how are you doing? Good  Comment:Much better. Via .   Do you have questions about your medications? No    Did you fill your medications? Yes    Do you have a follow-up appointment scheduled?Yes  Comment:dc clinic on 7/24, Card on 8/15    Discharging Department: Telemetry 7    Number of Attempts: 4  Current or previous attempts completed within two business days of discharge? Yes  Provided education regarding treatment plan, medication, self-management, ADLs? Yes  Has patient completed Advance Directive? If yes, advise them to bring to appointment. No  Comment:Encouraged to complete one.   Care Manager phone number provided? Yes  Comment:Susana [phone]  Is there anything else I can help you with? No

## 2017-07-25 NOTE — TELEPHONE ENCOUNTER
----- Message from FADY Yepez sent at 7/25/2017 10:25 AM PDT -----  Result reviewed. Please call patient to let her know that she has poor controlled of DM based on her A1C and most recent lab. Her kidney function has affected too. Have her continue to do BS log and call us by the end of this weekend to see how she is doing on increased levemir also have her stop taking lasix for now unless she noticed any increased swelling or increased BW > 2 lbs over night then she can take lasix 20 mg daily first and call us back. When she stop taking lasix, she can stop potassium supplement too.   Let me know if she has any other questions.    Thanks.  ADELE (Kyra), APRN

## 2017-07-25 NOTE — TELEPHONE ENCOUNTER
Called patient and spoke to daughter Jennifer and  informed of provider's message below regarding test results for HA1C, kidney function and to stop taking lasix and potassium for now and to monitor swelling, and weight.  Outcome: Jennifer voiced understanding of results and medication management and will inform patient.  Agreed to provider's recommendations to record BS level and to call us with reading Thursday afternoon or Friday morning.

## 2017-07-28 ENCOUNTER — HOSPITAL ENCOUNTER (INPATIENT)
Dept: HOSPITAL 8 - ED | Age: 72
LOS: 28 days | Discharge: HOME | DRG: 853 | End: 2017-08-25
Attending: FAMILY MEDICINE | Admitting: INTERNAL MEDICINE
Payer: MEDICARE

## 2017-07-28 VITALS — HEIGHT: 60 IN | BODY MASS INDEX: 29.74 KG/M2 | WEIGHT: 151.46 LBS

## 2017-07-28 VITALS — SYSTOLIC BLOOD PRESSURE: 135 MMHG | DIASTOLIC BLOOD PRESSURE: 58 MMHG

## 2017-07-28 VITALS — DIASTOLIC BLOOD PRESSURE: 70 MMHG | SYSTOLIC BLOOD PRESSURE: 145 MMHG

## 2017-07-28 DIAGNOSIS — Z99.11: ICD-10-CM

## 2017-07-28 DIAGNOSIS — D64.9: ICD-10-CM

## 2017-07-28 DIAGNOSIS — E11.21: ICD-10-CM

## 2017-07-28 DIAGNOSIS — N88.8: ICD-10-CM

## 2017-07-28 DIAGNOSIS — E11.22: ICD-10-CM

## 2017-07-28 DIAGNOSIS — Z51.5: ICD-10-CM

## 2017-07-28 DIAGNOSIS — I48.91: ICD-10-CM

## 2017-07-28 DIAGNOSIS — T44.7X5A: ICD-10-CM

## 2017-07-28 DIAGNOSIS — N25.81: ICD-10-CM

## 2017-07-28 DIAGNOSIS — A41.9: Primary | ICD-10-CM

## 2017-07-28 DIAGNOSIS — J15.9: ICD-10-CM

## 2017-07-28 DIAGNOSIS — I27.2: ICD-10-CM

## 2017-07-28 DIAGNOSIS — N17.0: ICD-10-CM

## 2017-07-28 DIAGNOSIS — I08.1: ICD-10-CM

## 2017-07-28 DIAGNOSIS — I50.31: ICD-10-CM

## 2017-07-28 DIAGNOSIS — E87.1: ICD-10-CM

## 2017-07-28 DIAGNOSIS — E78.5: ICD-10-CM

## 2017-07-28 DIAGNOSIS — Z79.4: ICD-10-CM

## 2017-07-28 DIAGNOSIS — J96.91: ICD-10-CM

## 2017-07-28 DIAGNOSIS — I95.3: ICD-10-CM

## 2017-07-28 DIAGNOSIS — E43: ICD-10-CM

## 2017-07-28 DIAGNOSIS — E55.9: ICD-10-CM

## 2017-07-28 DIAGNOSIS — F41.9: ICD-10-CM

## 2017-07-28 DIAGNOSIS — I13.0: ICD-10-CM

## 2017-07-28 DIAGNOSIS — R00.1: ICD-10-CM

## 2017-07-28 DIAGNOSIS — Z99.2: ICD-10-CM

## 2017-07-28 DIAGNOSIS — E78.1: ICD-10-CM

## 2017-07-28 DIAGNOSIS — I48.92: ICD-10-CM

## 2017-07-28 DIAGNOSIS — E87.2: ICD-10-CM

## 2017-07-28 DIAGNOSIS — I34.0: ICD-10-CM

## 2017-07-28 DIAGNOSIS — D68.69: ICD-10-CM

## 2017-07-28 DIAGNOSIS — E11.65: ICD-10-CM

## 2017-07-28 DIAGNOSIS — Z87.01: ICD-10-CM

## 2017-07-28 DIAGNOSIS — N18.9: ICD-10-CM

## 2017-07-28 DIAGNOSIS — I44.30: ICD-10-CM

## 2017-07-28 LAB
AST SERPL-CCNC: 20 U/L (ref 15–37)
BUN SERPL-MCNC: 40 MG/DL (ref 7–18)
HCT VFR BLD CALC: 35.5 % (ref 34.6–47.8)
HGB BLD-MCNC: 11.3 G/DL (ref 11.7–16.4)
IS PT STATUS REG ER OR PRE ER?: NO
IS PT STATUS REG ER OR PRE ER?: YES
IS PT STATUS REG ER OR PRE ER?: YES
WBC # BLD AUTO: 10.5 X10^3/UL (ref 3.4–10)

## 2017-07-28 PROCEDURE — 80202 ASSAY OF VANCOMYCIN: CPT

## 2017-07-28 PROCEDURE — 87340 HEPATITIS B SURFACE AG IA: CPT

## 2017-07-28 PROCEDURE — 94150 VITAL CAPACITY TEST: CPT

## 2017-07-28 PROCEDURE — 86706 HEP B SURFACE ANTIBODY: CPT

## 2017-07-28 PROCEDURE — 87040 BLOOD CULTURE FOR BACTERIA: CPT

## 2017-07-28 PROCEDURE — 82306 VITAMIN D 25 HYDROXY: CPT

## 2017-07-28 PROCEDURE — 82272 OCCULT BLD FECES 1-3 TESTS: CPT

## 2017-07-28 PROCEDURE — P9047 ALBUMIN (HUMAN), 25%, 50ML: HCPCS

## 2017-07-28 PROCEDURE — 93005 ELECTROCARDIOGRAM TRACING: CPT

## 2017-07-28 PROCEDURE — 31624 DX BRONCHOSCOPE/LAVAGE: CPT

## 2017-07-28 PROCEDURE — 83735 ASSAY OF MAGNESIUM: CPT

## 2017-07-28 PROCEDURE — 80053 COMPREHEN METABOLIC PANEL: CPT

## 2017-07-28 PROCEDURE — 84100 ASSAY OF PHOSPHORUS: CPT

## 2017-07-28 PROCEDURE — 36589 REMOVAL TUNNELED CV CATH: CPT

## 2017-07-28 PROCEDURE — 80069 RENAL FUNCTION PANEL: CPT

## 2017-07-28 PROCEDURE — 36558 INSERT TUNNELED CV CATH: CPT

## 2017-07-28 PROCEDURE — 36569 INSJ PICC 5 YR+ W/O IMAGING: CPT

## 2017-07-28 PROCEDURE — 99156 MOD SED OTH PHYS/QHP 5/>YRS: CPT

## 2017-07-28 PROCEDURE — C1894 INTRO/SHEATH, NON-LASER: HCPCS

## 2017-07-28 PROCEDURE — 94003 VENT MGMT INPAT SUBQ DAY: CPT

## 2017-07-28 PROCEDURE — 83540 ASSAY OF IRON: CPT

## 2017-07-28 PROCEDURE — 74230 X-RAY XM SWLNG FUNCJ C+: CPT

## 2017-07-28 PROCEDURE — 84478 ASSAY OF TRIGLYCERIDES: CPT

## 2017-07-28 PROCEDURE — 82728 ASSAY OF FERRITIN: CPT

## 2017-07-28 PROCEDURE — 93306 TTE W/DOPPLER COMPLETE: CPT

## 2017-07-28 PROCEDURE — 84443 ASSAY THYROID STIM HORMONE: CPT

## 2017-07-28 PROCEDURE — 82570 ASSAY OF URINE CREATININE: CPT

## 2017-07-28 PROCEDURE — 83970 ASSAY OF PARATHORMONE: CPT

## 2017-07-28 PROCEDURE — 85520 HEPARIN ASSAY: CPT

## 2017-07-28 PROCEDURE — 83550 IRON BINDING TEST: CPT

## 2017-07-28 PROCEDURE — 82962 GLUCOSE BLOOD TEST: CPT

## 2017-07-28 PROCEDURE — S0028 INJECTION, FAMOTIDINE, 20 MG: HCPCS

## 2017-07-28 PROCEDURE — A9502 TC99M TETROFOSMIN: HCPCS

## 2017-07-28 PROCEDURE — C1750 CATH, HEMODIALYSIS,LONG-TERM: HCPCS

## 2017-07-28 PROCEDURE — 71020: CPT

## 2017-07-28 PROCEDURE — 36600 WITHDRAWAL OF ARTERIAL BLOOD: CPT

## 2017-07-28 PROCEDURE — 82040 ASSAY OF SERUM ALBUMIN: CPT

## 2017-07-28 PROCEDURE — 81001 URINALYSIS AUTO W/SCOPE: CPT

## 2017-07-28 PROCEDURE — 87081 CULTURE SCREEN ONLY: CPT

## 2017-07-28 PROCEDURE — C1751 CATH, INF, PER/CENT/MIDLINE: HCPCS

## 2017-07-28 PROCEDURE — 94660 CPAP INITIATION&MGMT: CPT

## 2017-07-28 PROCEDURE — 93017 CV STRESS TEST TRACING ONLY: CPT

## 2017-07-28 PROCEDURE — 77001 FLUOROGUIDE FOR VEIN DEVICE: CPT

## 2017-07-28 PROCEDURE — C9898 INPNT STAY RADIOLABELED ITEM: HCPCS

## 2017-07-28 PROCEDURE — 87324 CLOSTRIDIUM AG IA: CPT

## 2017-07-28 PROCEDURE — 76937 US GUIDE VASCULAR ACCESS: CPT

## 2017-07-28 PROCEDURE — 99157 MOD SED OTHER PHYS/QHP EA: CPT

## 2017-07-28 PROCEDURE — 94640 AIRWAY INHALATION TREATMENT: CPT

## 2017-07-28 PROCEDURE — 96374 THER/PROPH/DIAG INJ IV PUSH: CPT

## 2017-07-28 PROCEDURE — 78452 HT MUSCLE IMAGE SPECT MULT: CPT

## 2017-07-28 PROCEDURE — 81050 URINALYSIS VOLUME MEASURE: CPT

## 2017-07-28 PROCEDURE — 36415 COLL VENOUS BLD VENIPUNCTURE: CPT

## 2017-07-28 PROCEDURE — 84484 ASSAY OF TROPONIN QUANT: CPT

## 2017-07-28 PROCEDURE — 86480 TB TEST CELL IMMUN MEASURE: CPT

## 2017-07-28 PROCEDURE — 82803 BLOOD GASES ANY COMBINATION: CPT

## 2017-07-28 PROCEDURE — 87205 SMEAR GRAM STAIN: CPT

## 2017-07-28 PROCEDURE — 80048 BASIC METABOLIC PNL TOTAL CA: CPT

## 2017-07-28 PROCEDURE — 83880 ASSAY OF NATRIURETIC PEPTIDE: CPT

## 2017-07-28 PROCEDURE — 85610 PROTHROMBIN TIME: CPT

## 2017-07-28 PROCEDURE — 86704 HEP B CORE ANTIBODY TOTAL: CPT

## 2017-07-28 PROCEDURE — 71010: CPT

## 2017-07-28 PROCEDURE — C1769 GUIDE WIRE: HCPCS

## 2017-07-28 PROCEDURE — 94002 VENT MGMT INPAT INIT DAY: CPT

## 2017-07-28 PROCEDURE — 85379 FIBRIN DEGRADATION QUANT: CPT

## 2017-07-28 PROCEDURE — 87070 CULTURE OTHR SPECIMN AEROBIC: CPT

## 2017-07-28 PROCEDURE — 85025 COMPLETE CBC W/AUTO DIFF WBC: CPT

## 2017-07-28 RX ADMIN — POTASSIUM CHLORIDE SCH MEQ: 750 TABLET, FILM COATED, EXTENDED RELEASE ORAL at 20:44

## 2017-07-28 RX ADMIN — INSULIN ASPART SCH UNITS: 100 INJECTION, SOLUTION INTRAVENOUS; SUBCUTANEOUS at 21:18

## 2017-07-28 RX ADMIN — TAZOBACTAM SODIUM AND PIPERACILLIN SODIUM SCH MLS/HR: 375; 3 INJECTION, SOLUTION INTRAVENOUS at 17:31

## 2017-07-28 RX ADMIN — ATORVASTATIN CALCIUM SCH MG: 40 TABLET, FILM COATED ORAL at 20:45

## 2017-07-28 RX ADMIN — INSULIN DETEMIR SCH UNITS: 100 INJECTION, SOLUTION SUBCUTANEOUS at 21:19

## 2017-07-28 RX ADMIN — ENOXAPARIN SODIUM SCH MG: 30 INJECTION SUBCUTANEOUS at 20:45

## 2017-07-28 RX ADMIN — METOPROLOL TARTRATE SCH MG: 25 TABLET, FILM COATED ORAL at 20:44

## 2017-07-28 NOTE — TELEPHONE ENCOUNTER
Spoke to patient's daughter Jennifer to inquire on patient's weight and BS reading.  Per Jennifer patient was not feeling well again, felt her legs were weak and complained of chest tightness.  Jennifer reported patient was taken to [facility] for evaluation and treatment.  Jennifer will call back Monday depending on patient's status.

## 2017-07-29 VITALS — SYSTOLIC BLOOD PRESSURE: 126 MMHG | DIASTOLIC BLOOD PRESSURE: 68 MMHG

## 2017-07-29 VITALS — SYSTOLIC BLOOD PRESSURE: 145 MMHG | DIASTOLIC BLOOD PRESSURE: 73 MMHG

## 2017-07-29 VITALS — DIASTOLIC BLOOD PRESSURE: 70 MMHG | SYSTOLIC BLOOD PRESSURE: 136 MMHG

## 2017-07-29 VITALS — SYSTOLIC BLOOD PRESSURE: 147 MMHG | DIASTOLIC BLOOD PRESSURE: 68 MMHG

## 2017-07-29 LAB
BUN SERPL-MCNC: 43 MG/DL (ref 7–18)
HCT VFR BLD CALC: 33.2 % (ref 34.6–47.8)
HGB BLD-MCNC: 10.8 G/DL (ref 11.7–16.4)
WBC # BLD AUTO: 8.8 X10^3/UL (ref 3.4–10)

## 2017-07-29 RX ADMIN — TRAZODONE HYDROCHLORIDE PRN MG: 50 TABLET ORAL at 00:22

## 2017-07-29 RX ADMIN — INSULIN ASPART SCH UNITS: 100 INJECTION, SOLUTION INTRAVENOUS; SUBCUTANEOUS at 07:00

## 2017-07-29 RX ADMIN — AMLODIPINE BESYLATE SCH MG: 5 TABLET ORAL at 09:06

## 2017-07-29 RX ADMIN — INSULIN ASPART SCH UNITS: 100 INJECTION, SOLUTION INTRAVENOUS; SUBCUTANEOUS at 11:00

## 2017-07-29 RX ADMIN — TAZOBACTAM SODIUM AND PIPERACILLIN SODIUM SCH MLS/HR: 375; 3 INJECTION, SOLUTION INTRAVENOUS at 16:22

## 2017-07-29 RX ADMIN — METOPROLOL TARTRATE SCH MG: 25 TABLET, FILM COATED ORAL at 21:10

## 2017-07-29 RX ADMIN — ASPIRIN 81 MG SCH MG: 81 TABLET ORAL at 09:07

## 2017-07-29 RX ADMIN — ATORVASTATIN CALCIUM SCH MG: 40 TABLET, FILM COATED ORAL at 21:10

## 2017-07-29 RX ADMIN — TAZOBACTAM SODIUM AND PIPERACILLIN SODIUM SCH MLS/HR: 375; 3 INJECTION, SOLUTION INTRAVENOUS at 00:55

## 2017-07-29 RX ADMIN — METOPROLOL TARTRATE SCH MG: 25 TABLET, FILM COATED ORAL at 09:07

## 2017-07-29 RX ADMIN — INSULIN ASPART SCH UNITS: 100 INJECTION, SOLUTION INTRAVENOUS; SUBCUTANEOUS at 16:21

## 2017-07-29 RX ADMIN — ONDANSETRON PRN MG: 2 INJECTION, SOLUTION INTRAMUSCULAR; INTRAVENOUS at 00:50

## 2017-07-29 RX ADMIN — TAZOBACTAM SODIUM AND PIPERACILLIN SODIUM SCH MLS/HR: 375; 3 INJECTION, SOLUTION INTRAVENOUS at 09:07

## 2017-07-29 RX ADMIN — INSULIN DETEMIR SCH UNITS: 100 INJECTION, SOLUTION SUBCUTANEOUS at 21:09

## 2017-07-29 RX ADMIN — INSULIN ASPART SCH UNITS: 100 INJECTION, SOLUTION INTRAVENOUS; SUBCUTANEOUS at 21:10

## 2017-07-29 RX ADMIN — INSULIN DETEMIR SCH UNITS: 100 INJECTION, SOLUTION SUBCUTANEOUS at 09:00

## 2017-07-29 RX ADMIN — INSULIN DETEMIR SCH UNITS: 100 INJECTION, SOLUTION SUBCUTANEOUS at 09:06

## 2017-07-29 RX ADMIN — POTASSIUM CHLORIDE SCH MEQ: 750 TABLET, FILM COATED, EXTENDED RELEASE ORAL at 21:10

## 2017-07-29 RX ADMIN — POTASSIUM CHLORIDE SCH MEQ: 750 TABLET, FILM COATED, EXTENDED RELEASE ORAL at 09:07

## 2017-07-29 RX ADMIN — ENOXAPARIN SODIUM SCH MG: 30 INJECTION SUBCUTANEOUS at 21:10

## 2017-07-30 VITALS — DIASTOLIC BLOOD PRESSURE: 67 MMHG | SYSTOLIC BLOOD PRESSURE: 136 MMHG

## 2017-07-30 VITALS — SYSTOLIC BLOOD PRESSURE: 147 MMHG | DIASTOLIC BLOOD PRESSURE: 73 MMHG

## 2017-07-30 VITALS — SYSTOLIC BLOOD PRESSURE: 136 MMHG | DIASTOLIC BLOOD PRESSURE: 73 MMHG

## 2017-07-30 VITALS — DIASTOLIC BLOOD PRESSURE: 72 MMHG | SYSTOLIC BLOOD PRESSURE: 151 MMHG

## 2017-07-30 LAB — BUN SERPL-MCNC: 53 MG/DL (ref 7–18)

## 2017-07-30 RX ADMIN — TAZOBACTAM SODIUM AND PIPERACILLIN SODIUM SCH MLS/HR: 375; 3 INJECTION, SOLUTION INTRAVENOUS at 09:53

## 2017-07-30 RX ADMIN — GUAIFENESIN AND DEXTROMETHORPHAN PRN ML: 100; 10 SYRUP ORAL at 20:33

## 2017-07-30 RX ADMIN — POTASSIUM CHLORIDE SCH MEQ: 750 TABLET, FILM COATED, EXTENDED RELEASE ORAL at 09:53

## 2017-07-30 RX ADMIN — ASPIRIN 81 MG SCH MG: 81 TABLET ORAL at 09:53

## 2017-07-30 RX ADMIN — TAZOBACTAM SODIUM AND PIPERACILLIN SODIUM SCH MLS/HR: 375; 3 INJECTION, SOLUTION INTRAVENOUS at 01:02

## 2017-07-30 RX ADMIN — GUAIFENESIN AND DEXTROMETHORPHAN PRN ML: 100; 10 SYRUP ORAL at 00:58

## 2017-07-30 RX ADMIN — AMLODIPINE BESYLATE SCH MG: 5 TABLET ORAL at 09:53

## 2017-07-30 RX ADMIN — METOPROLOL TARTRATE SCH MG: 25 TABLET, FILM COATED ORAL at 20:08

## 2017-07-30 RX ADMIN — INSULIN ASPART SCH UNITS: 100 INJECTION, SOLUTION INTRAVENOUS; SUBCUTANEOUS at 20:08

## 2017-07-30 RX ADMIN — METOPROLOL TARTRATE SCH MG: 25 TABLET, FILM COATED ORAL at 09:53

## 2017-07-30 RX ADMIN — INSULIN DETEMIR SCH UNITS: 100 INJECTION, SOLUTION SUBCUTANEOUS at 20:08

## 2017-07-30 RX ADMIN — PIPERACILLIN AND TAZOBACTAM SCH MLS/HR: 2; .25 INJECTION, POWDER, FOR SOLUTION INTRAVENOUS at 22:27

## 2017-07-30 RX ADMIN — PIPERACILLIN AND TAZOBACTAM SCH MLS/HR: 2; .25 INJECTION, POWDER, FOR SOLUTION INTRAVENOUS at 16:56

## 2017-07-30 RX ADMIN — INSULIN ASPART SCH UNITS: 100 INJECTION, SOLUTION INTRAVENOUS; SUBCUTANEOUS at 16:57

## 2017-07-30 RX ADMIN — ATORVASTATIN CALCIUM SCH MG: 40 TABLET, FILM COATED ORAL at 20:08

## 2017-07-30 RX ADMIN — GUAIFENESIN AND DEXTROMETHORPHAN PRN ML: 100; 10 SYRUP ORAL at 15:10

## 2017-07-30 RX ADMIN — INSULIN ASPART SCH UNITS: 100 INJECTION, SOLUTION INTRAVENOUS; SUBCUTANEOUS at 11:45

## 2017-07-30 RX ADMIN — INSULIN ASPART SCH UNITS: 100 INJECTION, SOLUTION INTRAVENOUS; SUBCUTANEOUS at 07:00

## 2017-07-30 RX ADMIN — ENOXAPARIN SODIUM SCH MG: 30 INJECTION SUBCUTANEOUS at 20:08

## 2017-07-30 RX ADMIN — POTASSIUM CHLORIDE SCH MEQ: 750 TABLET, FILM COATED, EXTENDED RELEASE ORAL at 20:08

## 2017-07-31 VITALS — DIASTOLIC BLOOD PRESSURE: 72 MMHG | SYSTOLIC BLOOD PRESSURE: 146 MMHG

## 2017-07-31 VITALS — SYSTOLIC BLOOD PRESSURE: 139 MMHG | DIASTOLIC BLOOD PRESSURE: 64 MMHG

## 2017-07-31 VITALS — SYSTOLIC BLOOD PRESSURE: 146 MMHG | DIASTOLIC BLOOD PRESSURE: 72 MMHG

## 2017-07-31 VITALS — DIASTOLIC BLOOD PRESSURE: 75 MMHG | SYSTOLIC BLOOD PRESSURE: 137 MMHG

## 2017-07-31 LAB — BUN SERPL-MCNC: 57 MG/DL (ref 7–18)

## 2017-07-31 RX ADMIN — INSULIN ASPART SCH UNITS: 100 INJECTION, SOLUTION INTRAVENOUS; SUBCUTANEOUS at 17:00

## 2017-07-31 RX ADMIN — METOPROLOL TARTRATE SCH MG: 25 TABLET, FILM COATED ORAL at 09:35

## 2017-07-31 RX ADMIN — SODIUM CHLORIDE SCH MLS/HR: 0.9 INJECTION, SOLUTION INTRAVENOUS at 20:26

## 2017-07-31 RX ADMIN — ATORVASTATIN CALCIUM SCH MG: 40 TABLET, FILM COATED ORAL at 20:25

## 2017-07-31 RX ADMIN — ENOXAPARIN SODIUM SCH MG: 30 INJECTION SUBCUTANEOUS at 20:24

## 2017-07-31 RX ADMIN — PIPERACILLIN AND TAZOBACTAM SCH MLS/HR: 2; .25 INJECTION, POWDER, FOR SOLUTION INTRAVENOUS at 09:36

## 2017-07-31 RX ADMIN — PIPERACILLIN AND TAZOBACTAM SCH MLS/HR: 2; .25 INJECTION, POWDER, FOR SOLUTION INTRAVENOUS at 22:00

## 2017-07-31 RX ADMIN — ONDANSETRON PRN MG: 2 INJECTION, SOLUTION INTRAMUSCULAR; INTRAVENOUS at 23:11

## 2017-07-31 RX ADMIN — INSULIN ASPART SCH UNITS: 100 INJECTION, SOLUTION INTRAVENOUS; SUBCUTANEOUS at 20:26

## 2017-07-31 RX ADMIN — AMLODIPINE BESYLATE SCH MG: 5 TABLET ORAL at 09:35

## 2017-07-31 RX ADMIN — INSULIN ASPART SCH UNITS: 100 INJECTION, SOLUTION INTRAVENOUS; SUBCUTANEOUS at 07:00

## 2017-07-31 RX ADMIN — ASPIRIN 81 MG SCH MG: 81 TABLET ORAL at 09:35

## 2017-07-31 RX ADMIN — PIPERACILLIN AND TAZOBACTAM SCH MLS/HR: 2; .25 INJECTION, POWDER, FOR SOLUTION INTRAVENOUS at 03:58

## 2017-07-31 RX ADMIN — INSULIN DETEMIR SCH UNITS: 100 INJECTION, SOLUTION SUBCUTANEOUS at 20:25

## 2017-07-31 RX ADMIN — SODIUM CHLORIDE SCH MLS/HR: 0.9 INJECTION, SOLUTION INTRAVENOUS at 09:35

## 2017-07-31 RX ADMIN — INSULIN ASPART SCH UNITS: 100 INJECTION, SOLUTION INTRAVENOUS; SUBCUTANEOUS at 12:45

## 2017-07-31 RX ADMIN — PIPERACILLIN AND TAZOBACTAM SCH MLS/HR: 2; .25 INJECTION, POWDER, FOR SOLUTION INTRAVENOUS at 16:59

## 2017-07-31 RX ADMIN — METOPROLOL TARTRATE SCH MG: 25 TABLET, FILM COATED ORAL at 20:24

## 2017-08-01 VITALS — SYSTOLIC BLOOD PRESSURE: 122 MMHG | DIASTOLIC BLOOD PRESSURE: 67 MMHG

## 2017-08-01 LAB
ABG COLLECTION SITE: (no result)
BUN SERPL-MCNC: 66 MG/DL (ref 7–18)
COLLATERAL CIRCULATION TESTING: (no result)
COLLATERAL CIRCULATION TESTING: NORMAL
HCT VFR BLD CALC: 34.8 % (ref 34.6–47.8)
HGB BLD-MCNC: 10.9 G/DL (ref 11.7–16.4)
O2/TOTAL GAS SETTING VFR VENT: 100 %
O2/TOTAL GAS SETTING VFR VENT: 70 %
O2/TOTAL GAS SETTING VFR VENT: 70 %
WBC # BLD AUTO: 14.8 X10^3/UL (ref 3.4–10)

## 2017-08-01 PROCEDURE — 0B9F8ZX DRAINAGE OF RIGHT LOWER LUNG LOBE, VIA NATURAL OR ARTIFICIAL OPENING ENDOSCOPIC, DIAGNOSTIC: ICD-10-PCS | Performed by: INTERNAL MEDICINE

## 2017-08-01 PROCEDURE — 0T9B70Z DRAINAGE OF BLADDER WITH DRAINAGE DEVICE, VIA NATURAL OR ARTIFICIAL OPENING: ICD-10-PCS | Performed by: FAMILY MEDICINE

## 2017-08-01 RX ADMIN — PIPERACILLIN AND TAZOBACTAM SCH MLS/HR: 2; .25 INJECTION, POWDER, FOR SOLUTION INTRAVENOUS at 21:41

## 2017-08-01 RX ADMIN — PIPERACILLIN AND TAZOBACTAM SCH MLS/HR: 2; .25 INJECTION, POWDER, FOR SOLUTION INTRAVENOUS at 14:44

## 2017-08-01 RX ADMIN — METOPROLOL TARTRATE SCH MG: 25 TABLET, FILM COATED ORAL at 21:07

## 2017-08-01 RX ADMIN — AMLODIPINE BESYLATE SCH MG: 5 TABLET ORAL at 12:01

## 2017-08-01 RX ADMIN — ACETAMINOPHEN PRN MG: 325 TABLET, FILM COATED ORAL at 18:30

## 2017-08-01 RX ADMIN — PROPOFOL PRN MLS/HR: 10 INJECTION, EMULSION INTRAVENOUS at 21:08

## 2017-08-01 RX ADMIN — ASPIRIN 81 MG SCH MG: 81 TABLET ORAL at 12:01

## 2017-08-01 RX ADMIN — ATORVASTATIN CALCIUM SCH MG: 40 TABLET, FILM COATED ORAL at 21:07

## 2017-08-01 RX ADMIN — INSULIN ASPART SCH UNITS: 100 INJECTION, SOLUTION INTRAVENOUS; SUBCUTANEOUS at 16:43

## 2017-08-01 RX ADMIN — METOPROLOL TARTRATE SCH MG: 25 TABLET, FILM COATED ORAL at 12:01

## 2017-08-01 RX ADMIN — FUROSEMIDE SCH MG: 10 INJECTION, SOLUTION INTRAMUSCULAR; INTRAVENOUS at 19:40

## 2017-08-01 RX ADMIN — INSULIN ASPART SCH UNITS: 100 INJECTION, SOLUTION INTRAVENOUS; SUBCUTANEOUS at 11:30

## 2017-08-01 RX ADMIN — ALBUTEROL SULFATE SCH MG: 2.5 SOLUTION RESPIRATORY (INHALATION) at 11:00

## 2017-08-01 RX ADMIN — HEPARIN SODIUM PRN UNITS: 5000 INJECTION, SOLUTION INTRAVENOUS; SUBCUTANEOUS at 23:00

## 2017-08-01 RX ADMIN — INSULIN ASPART SCH UNITS: 100 INJECTION, SOLUTION INTRAVENOUS; SUBCUTANEOUS at 07:00

## 2017-08-01 RX ADMIN — PIPERACILLIN AND TAZOBACTAM SCH MLS/HR: 2; .25 INJECTION, POWDER, FOR SOLUTION INTRAVENOUS at 04:56

## 2017-08-01 RX ADMIN — PIPERACILLIN AND TAZOBACTAM SCH MLS/HR: 2; .25 INJECTION, POWDER, FOR SOLUTION INTRAVENOUS at 17:57

## 2017-08-01 RX ADMIN — ALBUTEROL SULFATE SCH MG: 2.5 SOLUTION RESPIRATORY (INHALATION) at 19:37

## 2017-08-01 RX ADMIN — ALBUTEROL SULFATE SCH MG: 2.5 SOLUTION RESPIRATORY (INHALATION) at 13:36

## 2017-08-01 RX ADMIN — INSULIN ASPART SCH UNITS: 100 INJECTION, SOLUTION INTRAVENOUS; SUBCUTANEOUS at 21:25

## 2017-08-01 RX ADMIN — ALBUTEROL SULFATE SCH MG: 2.5 SOLUTION RESPIRATORY (INHALATION) at 06:45

## 2017-08-01 RX ADMIN — INSULIN DETEMIR SCH UNITS: 100 INJECTION, SOLUTION SUBCUTANEOUS at 21:24

## 2017-08-02 VITALS — DIASTOLIC BLOOD PRESSURE: 62 MMHG | SYSTOLIC BLOOD PRESSURE: 118 MMHG

## 2017-08-02 LAB
ABG COLLECTION SITE: (no result)
AST SERPL-CCNC: 20 U/L (ref 15–37)
BUN SERPL-MCNC: 76 MG/DL (ref 7–18)
COLLATERAL CIRCULATION TESTING: NORMAL
HCT VFR BLD CALC: 27.6 % (ref 34.6–47.8)
HGB BLD-MCNC: 9.2 G/DL (ref 11.7–16.4)
O2/TOTAL GAS SETTING VFR VENT: 40 %
WBC # BLD AUTO: 15.6 X10^3/UL (ref 3.4–10)

## 2017-08-02 PROCEDURE — 5A1D60Z: ICD-10-PCS | Performed by: INTERNAL MEDICINE

## 2017-08-02 PROCEDURE — B548ZZA ULTRASONOGRAPHY OF SUPERIOR VENA CAVA, GUIDANCE: ICD-10-PCS | Performed by: RADIOLOGY

## 2017-08-02 PROCEDURE — 02HV33Z INSERTION OF INFUSION DEVICE INTO SUPERIOR VENA CAVA, PERCUTANEOUS APPROACH: ICD-10-PCS | Performed by: RADIOLOGY

## 2017-08-02 RX ADMIN — ACETAMINOPHEN PRN MG: 325 TABLET, FILM COATED ORAL at 23:03

## 2017-08-02 RX ADMIN — PIPERACILLIN AND TAZOBACTAM SCH MLS/HR: 2; .25 INJECTION, POWDER, FOR SOLUTION INTRAVENOUS at 10:11

## 2017-08-02 RX ADMIN — ASPIRIN 81 MG SCH MG: 81 TABLET ORAL at 09:24

## 2017-08-02 RX ADMIN — ALBUMIN (HUMAN) PRN MLS/HR: 25 SOLUTION INTRAVENOUS at 15:01

## 2017-08-02 RX ADMIN — HEPARIN SODIUM PRN UNITS: 5000 INJECTION, SOLUTION INTRAVENOUS; SUBCUTANEOUS at 05:32

## 2017-08-02 RX ADMIN — HYDROCODONE BITARTRATE AND ACETAMINOPHEN PRN TAB: 5; 325 TABLET ORAL at 15:44

## 2017-08-02 RX ADMIN — AMLODIPINE BESYLATE SCH MG: 5 TABLET ORAL at 09:24

## 2017-08-02 RX ADMIN — PROPOFOL PRN MLS/HR: 10 INJECTION, EMULSION INTRAVENOUS at 22:11

## 2017-08-02 RX ADMIN — INSULIN ASPART SCH UNITS: 100 INJECTION, SOLUTION INTRAVENOUS; SUBCUTANEOUS at 06:54

## 2017-08-02 RX ADMIN — FUROSEMIDE SCH MG: 10 INJECTION, SOLUTION INTRAMUSCULAR; INTRAVENOUS at 03:28

## 2017-08-02 RX ADMIN — ALBUTEROL SULFATE SCH MG: 2.5 SOLUTION RESPIRATORY (INHALATION) at 10:27

## 2017-08-02 RX ADMIN — PIPERACILLIN AND TAZOBACTAM SCH MLS/HR: 2; .25 INJECTION, POWDER, FOR SOLUTION INTRAVENOUS at 03:34

## 2017-08-02 RX ADMIN — PROPOFOL PRN MLS/HR: 10 INJECTION, EMULSION INTRAVENOUS at 03:34

## 2017-08-02 RX ADMIN — FUROSEMIDE SCH MG: 10 INJECTION, SOLUTION INTRAMUSCULAR; INTRAVENOUS at 15:44

## 2017-08-02 RX ADMIN — PIPERACILLIN AND TAZOBACTAM SCH MLS/HR: 2; .25 INJECTION, POWDER, FOR SOLUTION INTRAVENOUS at 16:57

## 2017-08-02 RX ADMIN — ALBUMIN (HUMAN) PRN MLS/HR: 25 SOLUTION INTRAVENOUS at 13:50

## 2017-08-02 RX ADMIN — INSULIN ASPART SCH UNITS: 100 INJECTION, SOLUTION INTRAVENOUS; SUBCUTANEOUS at 20:28

## 2017-08-02 RX ADMIN — METOPROLOL TARTRATE SCH MG: 25 TABLET, FILM COATED ORAL at 20:28

## 2017-08-02 RX ADMIN — ATORVASTATIN CALCIUM SCH MG: 40 TABLET, FILM COATED ORAL at 20:28

## 2017-08-02 RX ADMIN — INSULIN ASPART SCH UNITS: 100 INJECTION, SOLUTION INTRAVENOUS; SUBCUTANEOUS at 11:00

## 2017-08-02 RX ADMIN — INSULIN ASPART SCH UNITS: 100 INJECTION, SOLUTION INTRAVENOUS; SUBCUTANEOUS at 17:08

## 2017-08-02 RX ADMIN — INSULIN DETEMIR SCH UNITS: 100 INJECTION, SOLUTION SUBCUTANEOUS at 20:39

## 2017-08-02 RX ADMIN — METOPROLOL TARTRATE SCH MG: 25 TABLET, FILM COATED ORAL at 09:00

## 2017-08-02 RX ADMIN — ALBUTEROL SULFATE SCH MG: 2.5 SOLUTION RESPIRATORY (INHALATION) at 14:00

## 2017-08-02 RX ADMIN — PIPERACILLIN AND TAZOBACTAM SCH MLS/HR: 2; .25 INJECTION, POWDER, FOR SOLUTION INTRAVENOUS at 22:10

## 2017-08-02 RX ADMIN — HYDROCODONE BITARTRATE AND ACETAMINOPHEN PRN TAB: 5; 325 TABLET ORAL at 17:31

## 2017-08-02 RX ADMIN — PROPOFOL PRN MLS/HR: 10 INJECTION, EMULSION INTRAVENOUS at 12:11

## 2017-08-02 RX ADMIN — ALBUTEROL SULFATE SCH MG: 2.5 SOLUTION RESPIRATORY (INHALATION) at 06:57

## 2017-08-02 RX ADMIN — ALBUTEROL SULFATE SCH MG: 2.5 SOLUTION RESPIRATORY (INHALATION) at 20:00

## 2017-08-02 RX ADMIN — PROPOFOL PRN MLS/HR: 10 INJECTION, EMULSION INTRAVENOUS at 16:56

## 2017-08-03 VITALS — SYSTOLIC BLOOD PRESSURE: 134 MMHG | DIASTOLIC BLOOD PRESSURE: 46 MMHG

## 2017-08-03 LAB
ABG COLLECTION SITE: (no result)
BUN SERPL-MCNC: 40 MG/DL (ref 7–18)
COLLATERAL CIRCULATION TESTING: NORMAL
HCT VFR BLD CALC: 28.6 % (ref 34.6–47.8)
HEP B SURF. AB: < 3.1 MIU/ML (ref 0–10)
HGB BLD-MCNC: 9.4 G/DL (ref 11.7–16.4)
O2/TOTAL GAS SETTING VFR VENT: 35 %
WBC # BLD AUTO: 12.3 X10^3/UL (ref 3.4–10)

## 2017-08-03 PROCEDURE — 5A1955Z RESPIRATORY VENTILATION, GREATER THAN 96 CONSECUTIVE HOURS: ICD-10-PCS | Performed by: FAMILY MEDICINE

## 2017-08-03 PROCEDURE — 0BH18EZ INSERTION OF ENDOTRACHEAL AIRWAY INTO TRACHEA, VIA NATURAL OR ARTIFICIAL OPENING ENDOSCOPIC: ICD-10-PCS | Performed by: FAMILY MEDICINE

## 2017-08-03 RX ADMIN — ALBUTEROL SULFATE SCH MG: 2.5 SOLUTION RESPIRATORY (INHALATION) at 10:27

## 2017-08-03 RX ADMIN — ALBUTEROL SULFATE SCH MG: 2.5 SOLUTION RESPIRATORY (INHALATION) at 06:57

## 2017-08-03 RX ADMIN — PROPOFOL PRN MLS/HR: 10 INJECTION, EMULSION INTRAVENOUS at 10:01

## 2017-08-03 RX ADMIN — PIPERACILLIN AND TAZOBACTAM SCH MLS/HR: 2; .25 INJECTION, POWDER, FOR SOLUTION INTRAVENOUS at 13:04

## 2017-08-03 RX ADMIN — PROPOFOL PRN MLS/HR: 10 INJECTION, EMULSION INTRAVENOUS at 18:32

## 2017-08-03 RX ADMIN — INSULIN ASPART SCH UNITS: 100 INJECTION, SOLUTION INTRAVENOUS; SUBCUTANEOUS at 03:00

## 2017-08-03 RX ADMIN — INSULIN DETEMIR SCH UNITS: 100 INJECTION, SOLUTION SUBCUTANEOUS at 21:10

## 2017-08-03 RX ADMIN — INSULIN ASPART SCH UNITS: 100 INJECTION, SOLUTION INTRAVENOUS; SUBCUTANEOUS at 09:55

## 2017-08-03 RX ADMIN — INSULIN ASPART SCH UNITS: 100 INJECTION, SOLUTION INTRAVENOUS; SUBCUTANEOUS at 16:49

## 2017-08-03 RX ADMIN — ALBUTEROL SULFATE SCH MG: 2.5 SOLUTION RESPIRATORY (INHALATION) at 14:51

## 2017-08-03 RX ADMIN — INSULIN ASPART SCH UNITS: 100 INJECTION, SOLUTION INTRAVENOUS; SUBCUTANEOUS at 21:07

## 2017-08-03 RX ADMIN — PROPOFOL PRN MLS/HR: 10 INJECTION, EMULSION INTRAVENOUS at 03:45

## 2017-08-03 RX ADMIN — AMLODIPINE BESYLATE SCH MG: 5 TABLET ORAL at 13:04

## 2017-08-03 RX ADMIN — ATORVASTATIN CALCIUM SCH MG: 40 TABLET, FILM COATED ORAL at 21:05

## 2017-08-03 RX ADMIN — PIPERACILLIN AND TAZOBACTAM SCH MLS/HR: 2; .25 INJECTION, POWDER, FOR SOLUTION INTRAVENOUS at 21:05

## 2017-08-03 RX ADMIN — ASPIRIN 81 MG SCH MG: 81 TABLET ORAL at 09:55

## 2017-08-03 RX ADMIN — METOPROLOL TARTRATE SCH MG: 25 TABLET, FILM COATED ORAL at 09:00

## 2017-08-03 RX ADMIN — ALBUTEROL SULFATE SCH MG: 2.5 SOLUTION RESPIRATORY (INHALATION) at 18:13

## 2017-08-03 RX ADMIN — PIPERACILLIN AND TAZOBACTAM SCH MLS/HR: 2; .25 INJECTION, POWDER, FOR SOLUTION INTRAVENOUS at 03:42

## 2017-08-03 RX ADMIN — METOPROLOL TARTRATE SCH MG: 25 TABLET, FILM COATED ORAL at 21:00

## 2017-08-03 RX ADMIN — PROPOFOL PRN MLS/HR: 10 INJECTION, EMULSION INTRAVENOUS at 12:54

## 2017-08-04 VITALS — SYSTOLIC BLOOD PRESSURE: 122 MMHG | DIASTOLIC BLOOD PRESSURE: 39 MMHG

## 2017-08-04 LAB
ABG COLLECTION SITE: (no result)
BUN SERPL-MCNC: 28 MG/DL (ref 7–18)
COLLATERAL CIRCULATION TESTING: NORMAL
HCT VFR BLD CALC: 27.9 % (ref 34.6–47.8)
HGB BLD-MCNC: 9.1 G/DL (ref 11.7–16.4)
O2/TOTAL GAS SETTING VFR VENT: 30 %
WBC # BLD AUTO: 10.8 X10^3/UL (ref 3.4–10)

## 2017-08-04 RX ADMIN — ALBUTEROL SULFATE SCH MG: 2.5 SOLUTION RESPIRATORY (INHALATION) at 06:53

## 2017-08-04 RX ADMIN — AMLODIPINE BESYLATE SCH MG: 5 TABLET ORAL at 09:31

## 2017-08-04 RX ADMIN — METOPROLOL TARTRATE SCH MG: 25 TABLET, FILM COATED ORAL at 09:31

## 2017-08-04 RX ADMIN — ALBUTEROL SULFATE SCH MG: 2.5 SOLUTION RESPIRATORY (INHALATION) at 02:27

## 2017-08-04 RX ADMIN — PROPOFOL PRN MLS/HR: 10 INJECTION, EMULSION INTRAVENOUS at 12:12

## 2017-08-04 RX ADMIN — INSULIN ASPART SCH UNITS: 100 INJECTION, SOLUTION INTRAVENOUS; SUBCUTANEOUS at 09:37

## 2017-08-04 RX ADMIN — INSULIN ASPART SCH UNITS: 100 INJECTION, SOLUTION INTRAVENOUS; SUBCUTANEOUS at 20:02

## 2017-08-04 RX ADMIN — PROPOFOL PRN MLS/HR: 10 INJECTION, EMULSION INTRAVENOUS at 00:35

## 2017-08-04 RX ADMIN — ATORVASTATIN CALCIUM SCH MG: 40 TABLET, FILM COATED ORAL at 20:00

## 2017-08-04 RX ADMIN — PIPERACILLIN AND TAZOBACTAM SCH MLS/HR: 2; .25 INJECTION, POWDER, FOR SOLUTION INTRAVENOUS at 13:00

## 2017-08-04 RX ADMIN — HEPARIN SODIUM PRN UNITS: 5000 INJECTION, SOLUTION INTRAVENOUS; SUBCUTANEOUS at 04:49

## 2017-08-04 RX ADMIN — INSULIN ASPART SCH UNITS: 100 INJECTION, SOLUTION INTRAVENOUS; SUBCUTANEOUS at 15:37

## 2017-08-04 RX ADMIN — FAMOTIDINE SCH MG: 20 TABLET, FILM COATED ORAL at 09:30

## 2017-08-04 RX ADMIN — HEPARIN SODIUM PRN UNITS: 5000 INJECTION, SOLUTION INTRAVENOUS; SUBCUTANEOUS at 12:11

## 2017-08-04 RX ADMIN — PIPERACILLIN AND TAZOBACTAM SCH MLS/HR: 2; .25 INJECTION, POWDER, FOR SOLUTION INTRAVENOUS at 20:01

## 2017-08-04 RX ADMIN — SCOPOLAMINE SCH PATCH: 1 PATCH, EXTENDED RELEASE TRANSDERMAL at 09:40

## 2017-08-04 RX ADMIN — ALBUTEROL SULFATE SCH MG: 2.5 SOLUTION RESPIRATORY (INHALATION) at 18:10

## 2017-08-04 RX ADMIN — ASPIRIN 81 MG SCH MG: 81 TABLET ORAL at 09:30

## 2017-08-04 RX ADMIN — INSULIN ASPART SCH UNITS: 100 INJECTION, SOLUTION INTRAVENOUS; SUBCUTANEOUS at 02:37

## 2017-08-04 RX ADMIN — METOPROLOL TARTRATE SCH MG: 25 TABLET, FILM COATED ORAL at 20:03

## 2017-08-04 RX ADMIN — PIPERACILLIN AND TAZOBACTAM SCH MLS/HR: 2; .25 INJECTION, POWDER, FOR SOLUTION INTRAVENOUS at 04:50

## 2017-08-04 RX ADMIN — PROPOFOL PRN MLS/HR: 10 INJECTION, EMULSION INTRAVENOUS at 16:13

## 2017-08-04 RX ADMIN — PROPOFOL PRN MLS/HR: 10 INJECTION, EMULSION INTRAVENOUS at 21:34

## 2017-08-04 RX ADMIN — PROPOFOL PRN MLS/HR: 10 INJECTION, EMULSION INTRAVENOUS at 05:56

## 2017-08-05 VITALS — SYSTOLIC BLOOD PRESSURE: 138 MMHG | DIASTOLIC BLOOD PRESSURE: 49 MMHG

## 2017-08-05 LAB
ABG COLLECTION SITE: (no result)
BUN SERPL-MCNC: 43 MG/DL (ref 7–18)
COLLATERAL CIRCULATION TESTING: NORMAL
HCT VFR BLD CALC: 27.8 % (ref 34.6–47.8)
HGB BLD-MCNC: 9 G/DL (ref 11.7–16.4)
O2/TOTAL GAS SETTING VFR VENT: 30 %
WBC # BLD AUTO: 10.7 X10^3/UL (ref 3.4–10)

## 2017-08-05 RX ADMIN — ATORVASTATIN CALCIUM SCH MG: 40 TABLET, FILM COATED ORAL at 19:51

## 2017-08-05 RX ADMIN — FAMOTIDINE SCH MG: 20 TABLET, FILM COATED ORAL at 10:31

## 2017-08-05 RX ADMIN — INSULIN HUMAN PRN MLS/HR: 100 INJECTION, SOLUTION PARENTERAL at 22:21

## 2017-08-05 RX ADMIN — PROPOFOL PRN MLS/HR: 10 INJECTION, EMULSION INTRAVENOUS at 03:22

## 2017-08-05 RX ADMIN — ASPIRIN 81 MG SCH MG: 81 TABLET ORAL at 10:31

## 2017-08-05 RX ADMIN — PIPERACILLIN AND TAZOBACTAM SCH MLS/HR: 2; .25 INJECTION, POWDER, FOR SOLUTION INTRAVENOUS at 13:15

## 2017-08-05 RX ADMIN — DEXAMETHASONE SODIUM PHOSPHATE SCH MG: 4 INJECTION, SOLUTION INTRA-ARTICULAR; INTRALESIONAL; INTRAMUSCULAR; INTRAVENOUS; SOFT TISSUE at 12:02

## 2017-08-05 RX ADMIN — PROPOFOL PRN MLS/HR: 10 INJECTION, EMULSION INTRAVENOUS at 14:00

## 2017-08-05 RX ADMIN — HEPARIN SODIUM PRN UNITS: 5000 INJECTION, SOLUTION INTRAVENOUS; SUBCUTANEOUS at 13:24

## 2017-08-05 RX ADMIN — ALBUTEROL SULFATE SCH MG: 2.5 SOLUTION RESPIRATORY (INHALATION) at 18:13

## 2017-08-05 RX ADMIN — INSULIN HUMAN PRN MLS/HR: 100 INJECTION, SOLUTION PARENTERAL at 16:43

## 2017-08-05 RX ADMIN — DEXAMETHASONE SODIUM PHOSPHATE SCH MG: 4 INJECTION, SOLUTION INTRA-ARTICULAR; INTRALESIONAL; INTRAMUSCULAR; INTRAVENOUS; SOFT TISSUE at 21:38

## 2017-08-05 RX ADMIN — PIPERACILLIN AND TAZOBACTAM SCH MLS/HR: 2; .25 INJECTION, POWDER, FOR SOLUTION INTRAVENOUS at 19:51

## 2017-08-05 RX ADMIN — ALBUTEROL SULFATE SCH MG: 2.5 SOLUTION RESPIRATORY (INHALATION) at 06:35

## 2017-08-05 RX ADMIN — PROPOFOL PRN MLS/HR: 10 INJECTION, EMULSION INTRAVENOUS at 23:29

## 2017-08-05 RX ADMIN — DEXAMETHASONE SODIUM PHOSPHATE SCH MG: 4 INJECTION, SOLUTION INTRA-ARTICULAR; INTRALESIONAL; INTRAMUSCULAR; INTRAVENOUS; SOFT TISSUE at 16:43

## 2017-08-05 RX ADMIN — PIPERACILLIN AND TAZOBACTAM SCH MLS/HR: 2; .25 INJECTION, POWDER, FOR SOLUTION INTRAVENOUS at 04:13

## 2017-08-05 RX ADMIN — INSULIN ASPART SCH UNITS: 100 INJECTION, SOLUTION INTRAVENOUS; SUBCUTANEOUS at 10:33

## 2017-08-05 RX ADMIN — HYDROCODONE BITARTRATE AND ACETAMINOPHEN PRN TAB: 5; 325 TABLET ORAL at 20:36

## 2017-08-05 RX ADMIN — HEPARIN SODIUM PRN UNITS: 5000 INJECTION, SOLUTION INTRAVENOUS; SUBCUTANEOUS at 00:54

## 2017-08-05 RX ADMIN — INSULIN ASPART SCH UNITS: 100 INJECTION, SOLUTION INTRAVENOUS; SUBCUTANEOUS at 02:34

## 2017-08-06 VITALS — SYSTOLIC BLOOD PRESSURE: 142 MMHG | DIASTOLIC BLOOD PRESSURE: 51 MMHG

## 2017-08-06 VITALS — DIASTOLIC BLOOD PRESSURE: 47 MMHG | SYSTOLIC BLOOD PRESSURE: 146 MMHG

## 2017-08-06 LAB
ABG COLLECTION SITE: (no result)
BUN SERPL-MCNC: 56 MG/DL (ref 7–18)
HCT VFR BLD CALC: 29.1 % (ref 34.6–47.8)
HGB BLD-MCNC: 9.4 G/DL (ref 11.7–16.4)
O2/TOTAL GAS SETTING VFR VENT: 30 %
OCCBLD OBC: (no result)
WBC # BLD AUTO: 9.6 X10^3/UL (ref 3.4–10)

## 2017-08-06 RX ADMIN — PROPOFOL PRN MLS/HR: 10 INJECTION, EMULSION INTRAVENOUS at 16:10

## 2017-08-06 RX ADMIN — PIPERACILLIN AND TAZOBACTAM SCH MLS/HR: 2; .25 INJECTION, POWDER, FOR SOLUTION INTRAVENOUS at 22:14

## 2017-08-06 RX ADMIN — ACETAMINOPHEN PRN MG: 325 TABLET, FILM COATED ORAL at 22:14

## 2017-08-06 RX ADMIN — DEXAMETHASONE SODIUM PHOSPHATE SCH MG: 4 INJECTION, SOLUTION INTRA-ARTICULAR; INTRALESIONAL; INTRAMUSCULAR; INTRAVENOUS; SOFT TISSUE at 23:22

## 2017-08-06 RX ADMIN — ALBUTEROL SULFATE SCH MG: 2.5 SOLUTION RESPIRATORY (INHALATION) at 10:16

## 2017-08-06 RX ADMIN — INSULIN HUMAN PRN MLS/HR: 100 INJECTION, SOLUTION PARENTERAL at 06:02

## 2017-08-06 RX ADMIN — ASPIRIN 81 MG SCH MG: 81 TABLET ORAL at 09:34

## 2017-08-06 RX ADMIN — INSULIN HUMAN PRN MLS/HR: 100 INJECTION, SOLUTION PARENTERAL at 02:36

## 2017-08-06 RX ADMIN — ALBUTEROL SULFATE SCH MG: 2.5 SOLUTION RESPIRATORY (INHALATION) at 18:34

## 2017-08-06 RX ADMIN — HYDROCODONE BITARTRATE AND ACETAMINOPHEN PRN TAB: 5; 325 TABLET ORAL at 04:11

## 2017-08-06 RX ADMIN — FAMOTIDINE SCH MG: 20 TABLET, FILM COATED ORAL at 09:34

## 2017-08-06 RX ADMIN — HYDROCODONE BITARTRATE AND ACETAMINOPHEN PRN TAB: 5; 325 TABLET ORAL at 17:07

## 2017-08-06 RX ADMIN — DEXAMETHASONE SODIUM PHOSPHATE SCH MG: 4 INJECTION, SOLUTION INTRA-ARTICULAR; INTRALESIONAL; INTRAMUSCULAR; INTRAVENOUS; SOFT TISSUE at 11:37

## 2017-08-06 RX ADMIN — PIPERACILLIN AND TAZOBACTAM SCH MLS/HR: 2; .25 INJECTION, POWDER, FOR SOLUTION INTRAVENOUS at 12:40

## 2017-08-06 RX ADMIN — ATORVASTATIN CALCIUM SCH MG: 40 TABLET, FILM COATED ORAL at 22:13

## 2017-08-06 RX ADMIN — DEXAMETHASONE SODIUM PHOSPHATE SCH MG: 4 INJECTION, SOLUTION INTRA-ARTICULAR; INTRALESIONAL; INTRAMUSCULAR; INTRAVENOUS; SOFT TISSUE at 17:10

## 2017-08-06 RX ADMIN — ALBUTEROL SULFATE SCH MG: 2.5 SOLUTION RESPIRATORY (INHALATION) at 14:07

## 2017-08-06 RX ADMIN — PIPERACILLIN AND TAZOBACTAM SCH MLS/HR: 2; .25 INJECTION, POWDER, FOR SOLUTION INTRAVENOUS at 04:09

## 2017-08-06 RX ADMIN — PROPOFOL PRN MLS/HR: 10 INJECTION, EMULSION INTRAVENOUS at 10:53

## 2017-08-06 RX ADMIN — PROPOFOL PRN MLS/HR: 10 INJECTION, EMULSION INTRAVENOUS at 18:27

## 2017-08-06 RX ADMIN — ALBUTEROL SULFATE SCH MG: 2.5 SOLUTION RESPIRATORY (INHALATION) at 06:15

## 2017-08-06 RX ADMIN — DEXAMETHASONE SODIUM PHOSPHATE SCH MG: 4 INJECTION, SOLUTION INTRA-ARTICULAR; INTRALESIONAL; INTRAMUSCULAR; INTRAVENOUS; SOFT TISSUE at 04:09

## 2017-08-07 VITALS — SYSTOLIC BLOOD PRESSURE: 150 MMHG | DIASTOLIC BLOOD PRESSURE: 51 MMHG

## 2017-08-07 LAB
ABG COLLECTION SITE: (no result)
BUN SERPL-MCNC: 67 MG/DL (ref 7–18)
COLLATERAL CIRCULATION TESTING: NORMAL
HCT VFR BLD CALC: 27.3 % (ref 34.6–47.8)
HGB BLD-MCNC: 9 G/DL (ref 11.7–16.4)
O2/TOTAL GAS SETTING VFR VENT: 30 %
WBC # BLD AUTO: 11.2 X10^3/UL (ref 3.4–10)

## 2017-08-07 RX ADMIN — ALBUTEROL SULFATE SCH MG: 2.5 SOLUTION RESPIRATORY (INHALATION) at 13:53

## 2017-08-07 RX ADMIN — INSULIN ASPART SCH UNITS: 100 INJECTION, SOLUTION INTRAVENOUS; SUBCUTANEOUS at 23:29

## 2017-08-07 RX ADMIN — INSULIN HUMAN PRN MLS/HR: 100 INJECTION, SOLUTION PARENTERAL at 08:32

## 2017-08-07 RX ADMIN — SCOPOLAMINE SCH PATCH: 1 PATCH, EXTENDED RELEASE TRANSDERMAL at 09:00

## 2017-08-07 RX ADMIN — ASPIRIN 81 MG SCH MG: 81 TABLET ORAL at 09:00

## 2017-08-07 RX ADMIN — PIPERACILLIN AND TAZOBACTAM SCH MLS/HR: 2; .25 INJECTION, POWDER, FOR SOLUTION INTRAVENOUS at 15:31

## 2017-08-07 RX ADMIN — ATORVASTATIN CALCIUM SCH MG: 40 TABLET, FILM COATED ORAL at 20:48

## 2017-08-07 RX ADMIN — PIPERACILLIN AND TAZOBACTAM SCH MLS/HR: 2; .25 INJECTION, POWDER, FOR SOLUTION INTRAVENOUS at 06:37

## 2017-08-07 RX ADMIN — PIPERACILLIN AND TAZOBACTAM SCH MLS/HR: 2; .25 INJECTION, POWDER, FOR SOLUTION INTRAVENOUS at 20:48

## 2017-08-07 RX ADMIN — FAMOTIDINE SCH MG: 20 TABLET, FILM COATED ORAL at 09:00

## 2017-08-07 RX ADMIN — INSULIN HUMAN PRN MLS/HR: 100 INJECTION, SOLUTION PARENTERAL at 01:39

## 2017-08-07 RX ADMIN — ALBUTEROL SULFATE SCH MG: 2.5 SOLUTION RESPIRATORY (INHALATION) at 06:45

## 2017-08-07 RX ADMIN — DEXAMETHASONE SODIUM PHOSPHATE SCH MG: 4 INJECTION, SOLUTION INTRA-ARTICULAR; INTRALESIONAL; INTRAMUSCULAR; INTRAVENOUS; SOFT TISSUE at 06:37

## 2017-08-07 RX ADMIN — INSULIN DETEMIR SCH UNITS: 100 INJECTION, SOLUTION SUBCUTANEOUS at 15:34

## 2017-08-07 RX ADMIN — ALBUTEROL SULFATE SCH MG: 2.5 SOLUTION RESPIRATORY (INHALATION) at 20:00

## 2017-08-07 RX ADMIN — INSULIN ASPART SCH UNITS: 100 INJECTION, SOLUTION INTRAVENOUS; SUBCUTANEOUS at 19:35

## 2017-08-07 RX ADMIN — HYDROCODONE BITARTRATE AND ACETAMINOPHEN PRN TAB: 5; 325 TABLET ORAL at 15:42

## 2017-08-07 RX ADMIN — HYDROCODONE BITARTRATE AND ACETAMINOPHEN PRN TAB: 5; 325 TABLET ORAL at 06:37

## 2017-08-07 RX ADMIN — ALBUTEROL SULFATE SCH MG: 2.5 SOLUTION RESPIRATORY (INHALATION) at 10:19

## 2017-08-08 VITALS — DIASTOLIC BLOOD PRESSURE: 69 MMHG | SYSTOLIC BLOOD PRESSURE: 156 MMHG

## 2017-08-08 LAB
ABG COLLECTION SITE: (no result)
BUN SERPL-MCNC: 81 MG/DL (ref 7–18)
COLLATERAL CIRCULATION TESTING: NORMAL
HCT VFR BLD CALC: 28.7 % (ref 34.6–47.8)
HGB BLD-MCNC: 9.4 G/DL (ref 11.7–16.4)
WBC # BLD AUTO: 17.5 X10^3/UL (ref 3.4–10)

## 2017-08-08 RX ADMIN — PIPERACILLIN AND TAZOBACTAM SCH MLS/HR: 2; .25 INJECTION, POWDER, FOR SOLUTION INTRAVENOUS at 05:44

## 2017-08-08 RX ADMIN — ALBUTEROL SULFATE SCH MG: 2.5 SOLUTION RESPIRATORY (INHALATION) at 13:53

## 2017-08-08 RX ADMIN — ALBUTEROL SULFATE SCH MG: 2.5 SOLUTION RESPIRATORY (INHALATION) at 19:29

## 2017-08-08 RX ADMIN — ONDANSETRON PRN MG: 2 INJECTION, SOLUTION INTRAMUSCULAR; INTRAVENOUS at 19:17

## 2017-08-08 RX ADMIN — ALBUTEROL SULFATE SCH MG: 2.5 SOLUTION RESPIRATORY (INHALATION) at 07:08

## 2017-08-08 RX ADMIN — FAMOTIDINE SCH MG: 20 TABLET, FILM COATED ORAL at 08:47

## 2017-08-08 RX ADMIN — ACETAMINOPHEN PRN MG: 325 TABLET, FILM COATED ORAL at 14:32

## 2017-08-08 RX ADMIN — ASPIRIN 81 MG SCH MG: 81 TABLET ORAL at 08:47

## 2017-08-08 RX ADMIN — ATORVASTATIN CALCIUM SCH MG: 40 TABLET, FILM COATED ORAL at 21:01

## 2017-08-08 RX ADMIN — ACETAMINOPHEN PRN MG: 325 TABLET, FILM COATED ORAL at 21:01

## 2017-08-08 RX ADMIN — INSULIN ASPART SCH UNITS: 100 INJECTION, SOLUTION INTRAVENOUS; SUBCUTANEOUS at 17:34

## 2017-08-08 RX ADMIN — INSULIN DETEMIR SCH UNITS: 100 INJECTION, SOLUTION SUBCUTANEOUS at 05:45

## 2017-08-08 RX ADMIN — INSULIN ASPART SCH UNITS: 100 INJECTION, SOLUTION INTRAVENOUS; SUBCUTANEOUS at 21:04

## 2017-08-08 RX ADMIN — INSULIN DETEMIR SCH UNITS: 100 INJECTION, SOLUTION SUBCUTANEOUS at 14:36

## 2017-08-08 RX ADMIN — ALBUTEROL SULFATE SCH MG: 2.5 SOLUTION RESPIRATORY (INHALATION) at 10:26

## 2017-08-08 RX ADMIN — INSULIN ASPART SCH UNITS: 100 INJECTION, SOLUTION INTRAVENOUS; SUBCUTANEOUS at 07:00

## 2017-08-08 RX ADMIN — PIPERACILLIN AND TAZOBACTAM SCH MLS/HR: 2; .25 INJECTION, POWDER, FOR SOLUTION INTRAVENOUS at 13:03

## 2017-08-08 RX ADMIN — PIPERACILLIN AND TAZOBACTAM SCH MLS/HR: 2; .25 INJECTION, POWDER, FOR SOLUTION INTRAVENOUS at 21:01

## 2017-08-08 RX ADMIN — INSULIN ASPART SCH UNITS: 100 INJECTION, SOLUTION INTRAVENOUS; SUBCUTANEOUS at 10:59

## 2017-08-09 VITALS — DIASTOLIC BLOOD PRESSURE: 56 MMHG | SYSTOLIC BLOOD PRESSURE: 132 MMHG

## 2017-08-09 VITALS — SYSTOLIC BLOOD PRESSURE: 126 MMHG | DIASTOLIC BLOOD PRESSURE: 46 MMHG

## 2017-08-09 VITALS — SYSTOLIC BLOOD PRESSURE: 174 MMHG | DIASTOLIC BLOOD PRESSURE: 66 MMHG

## 2017-08-09 VITALS — DIASTOLIC BLOOD PRESSURE: 61 MMHG | SYSTOLIC BLOOD PRESSURE: 157 MMHG

## 2017-08-09 LAB
ABG COLLECTION SITE: (no result)
BUN SERPL-MCNC: 78 MG/DL (ref 7–18)
COLLATERAL CIRCULATION TESTING: NORMAL
DIFF TOTAL CELLS COUNTED: (no result)
HCT VFR BLD CALC: 29.3 % (ref 34.6–47.8)
HGB BLD-MCNC: 9.4 G/DL (ref 11.7–16.4)
LG PLATELETS BLD QL SMEAR: (no result)
VERIFY COUNTS?: YES
WBC # BLD AUTO: 13.3 X10^3/UL (ref 3.4–10)

## 2017-08-09 RX ADMIN — ALBUTEROL SULFATE SCH MG: 2.5 SOLUTION RESPIRATORY (INHALATION) at 06:57

## 2017-08-09 RX ADMIN — HYDROCODONE BITARTRATE AND ACETAMINOPHEN PRN TAB: 5; 325 TABLET ORAL at 11:26

## 2017-08-09 RX ADMIN — HYDROCODONE BITARTRATE AND ACETAMINOPHEN PRN TAB: 5; 325 TABLET ORAL at 23:15

## 2017-08-09 RX ADMIN — ASPIRIN 81 MG SCH MG: 81 TABLET ORAL at 07:13

## 2017-08-09 RX ADMIN — INSULIN DETEMIR SCH UNITS: 100 INJECTION, SOLUTION SUBCUTANEOUS at 05:00

## 2017-08-09 RX ADMIN — INSULIN DETEMIR SCH UNITS: 100 INJECTION, SOLUTION SUBCUTANEOUS at 17:06

## 2017-08-09 RX ADMIN — ONDANSETRON PRN MG: 2 INJECTION, SOLUTION INTRAMUSCULAR; INTRAVENOUS at 04:23

## 2017-08-09 RX ADMIN — INSULIN ASPART SCH UNITS: 100 INJECTION, SOLUTION INTRAVENOUS; SUBCUTANEOUS at 07:13

## 2017-08-09 RX ADMIN — INSULIN ASPART SCH UNITS: 100 INJECTION, SOLUTION INTRAVENOUS; SUBCUTANEOUS at 20:46

## 2017-08-09 RX ADMIN — PIPERACILLIN AND TAZOBACTAM SCH MLS/HR: 2; .25 INJECTION, POWDER, FOR SOLUTION INTRAVENOUS at 13:19

## 2017-08-09 RX ADMIN — ATORVASTATIN CALCIUM SCH MG: 40 TABLET, FILM COATED ORAL at 20:46

## 2017-08-09 RX ADMIN — INSULIN ASPART SCH UNITS: 100 INJECTION, SOLUTION INTRAVENOUS; SUBCUTANEOUS at 11:27

## 2017-08-09 RX ADMIN — ALBUTEROL SULFATE SCH MG: 2.5 SOLUTION RESPIRATORY (INHALATION) at 10:53

## 2017-08-09 RX ADMIN — ALBUTEROL SULFATE SCH MG: 2.5 SOLUTION RESPIRATORY (INHALATION) at 15:15

## 2017-08-09 RX ADMIN — PIPERACILLIN AND TAZOBACTAM SCH MLS/HR: 2; .25 INJECTION, POWDER, FOR SOLUTION INTRAVENOUS at 05:00

## 2017-08-09 RX ADMIN — PIPERACILLIN AND TAZOBACTAM SCH MLS/HR: 2; .25 INJECTION, POWDER, FOR SOLUTION INTRAVENOUS at 21:17

## 2017-08-09 RX ADMIN — ALBUTEROL SULFATE SCH MG: 2.5 SOLUTION RESPIRATORY (INHALATION) at 19:46

## 2017-08-09 RX ADMIN — FAMOTIDINE SCH MG: 20 TABLET, FILM COATED ORAL at 07:13

## 2017-08-09 RX ADMIN — INSULIN ASPART SCH UNITS: 100 INJECTION, SOLUTION INTRAVENOUS; SUBCUTANEOUS at 17:07

## 2017-08-10 VITALS — SYSTOLIC BLOOD PRESSURE: 145 MMHG | DIASTOLIC BLOOD PRESSURE: 66 MMHG

## 2017-08-10 VITALS — DIASTOLIC BLOOD PRESSURE: 58 MMHG | SYSTOLIC BLOOD PRESSURE: 134 MMHG

## 2017-08-10 VITALS — DIASTOLIC BLOOD PRESSURE: 72 MMHG | SYSTOLIC BLOOD PRESSURE: 151 MMHG

## 2017-08-10 VITALS — SYSTOLIC BLOOD PRESSURE: 160 MMHG | DIASTOLIC BLOOD PRESSURE: 70 MMHG

## 2017-08-10 LAB
BUN SERPL-MCNC: 81 MG/DL (ref 7–18)
HCT VFR BLD CALC: 28.4 % (ref 34.6–47.8)
HGB BLD-MCNC: 9.2 G/DL (ref 11.7–16.4)
WBC # BLD AUTO: 12.2 X10^3/UL (ref 3.4–10)

## 2017-08-10 RX ADMIN — INSULIN ASPART SCH UNITS: 100 INJECTION, SOLUTION INTRAVENOUS; SUBCUTANEOUS at 20:34

## 2017-08-10 RX ADMIN — ASPIRIN 81 MG SCH MG: 81 TABLET ORAL at 09:30

## 2017-08-10 RX ADMIN — INSULIN DETEMIR SCH UNITS: 100 INJECTION, SOLUTION SUBCUTANEOUS at 05:45

## 2017-08-10 RX ADMIN — INSULIN ASPART SCH UNITS: 100 INJECTION, SOLUTION INTRAVENOUS; SUBCUTANEOUS at 09:30

## 2017-08-10 RX ADMIN — INSULIN ASPART SCH UNITS: 100 INJECTION, SOLUTION INTRAVENOUS; SUBCUTANEOUS at 16:00

## 2017-08-10 RX ADMIN — FAMOTIDINE SCH MG: 20 TABLET, FILM COATED ORAL at 09:30

## 2017-08-10 RX ADMIN — ATORVASTATIN CALCIUM SCH MG: 40 TABLET, FILM COATED ORAL at 20:33

## 2017-08-10 RX ADMIN — PIPERACILLIN AND TAZOBACTAM SCH MLS/HR: 2; .25 INJECTION, POWDER, FOR SOLUTION INTRAVENOUS at 23:16

## 2017-08-10 RX ADMIN — INSULIN DETEMIR SCH UNITS: 100 INJECTION, SOLUTION SUBCUTANEOUS at 18:08

## 2017-08-10 RX ADMIN — PIPERACILLIN AND TAZOBACTAM SCH MLS/HR: 2; .25 INJECTION, POWDER, FOR SOLUTION INTRAVENOUS at 05:11

## 2017-08-10 RX ADMIN — PIPERACILLIN AND TAZOBACTAM SCH MLS/HR: 2; .25 INJECTION, POWDER, FOR SOLUTION INTRAVENOUS at 15:26

## 2017-08-10 RX ADMIN — INSULIN ASPART SCH UNITS: 100 INJECTION, SOLUTION INTRAVENOUS; SUBCUTANEOUS at 10:50

## 2017-08-11 VITALS — SYSTOLIC BLOOD PRESSURE: 151 MMHG | DIASTOLIC BLOOD PRESSURE: 71 MMHG

## 2017-08-11 VITALS — SYSTOLIC BLOOD PRESSURE: 155 MMHG | DIASTOLIC BLOOD PRESSURE: 73 MMHG

## 2017-08-11 VITALS — DIASTOLIC BLOOD PRESSURE: 63 MMHG | SYSTOLIC BLOOD PRESSURE: 149 MMHG

## 2017-08-11 VITALS — DIASTOLIC BLOOD PRESSURE: 73 MMHG | SYSTOLIC BLOOD PRESSURE: 161 MMHG

## 2017-08-11 LAB
BUN SERPL-MCNC: 35 MG/DL (ref 7–18)
HCT VFR BLD CALC: 30 % (ref 34.6–47.8)
HGB BLD-MCNC: 9.8 G/DL (ref 11.7–16.4)
WBC # BLD AUTO: 12.5 X10^3/UL (ref 3.4–10)

## 2017-08-11 RX ADMIN — FAMOTIDINE SCH MG: 20 TABLET, FILM COATED ORAL at 09:26

## 2017-08-11 RX ADMIN — ASPIRIN 81 MG SCH MG: 81 TABLET ORAL at 09:27

## 2017-08-11 RX ADMIN — INSULIN ASPART SCH UNITS: 100 INJECTION, SOLUTION INTRAVENOUS; SUBCUTANEOUS at 21:49

## 2017-08-11 RX ADMIN — ATORVASTATIN CALCIUM SCH MG: 40 TABLET, FILM COATED ORAL at 21:48

## 2017-08-11 RX ADMIN — INSULIN ASPART SCH UNITS: 100 INJECTION, SOLUTION INTRAVENOUS; SUBCUTANEOUS at 09:27

## 2017-08-11 RX ADMIN — PIPERACILLIN AND TAZOBACTAM SCH MLS/HR: 2; .25 INJECTION, POWDER, FOR SOLUTION INTRAVENOUS at 23:31

## 2017-08-11 RX ADMIN — INSULIN ASPART SCH UNITS: 100 INJECTION, SOLUTION INTRAVENOUS; SUBCUTANEOUS at 17:40

## 2017-08-11 RX ADMIN — PIPERACILLIN AND TAZOBACTAM SCH MLS/HR: 2; .25 INJECTION, POWDER, FOR SOLUTION INTRAVENOUS at 09:31

## 2017-08-11 RX ADMIN — INSULIN DETEMIR SCH UNITS: 100 INJECTION, SOLUTION SUBCUTANEOUS at 05:59

## 2017-08-11 RX ADMIN — PIPERACILLIN AND TAZOBACTAM SCH MLS/HR: 2; .25 INJECTION, POWDER, FOR SOLUTION INTRAVENOUS at 16:08

## 2017-08-11 RX ADMIN — INSULIN DETEMIR SCH UNITS: 100 INJECTION, SOLUTION SUBCUTANEOUS at 17:40

## 2017-08-11 RX ADMIN — INSULIN ASPART SCH UNITS: 100 INJECTION, SOLUTION INTRAVENOUS; SUBCUTANEOUS at 13:06

## 2017-08-12 VITALS — DIASTOLIC BLOOD PRESSURE: 69 MMHG | SYSTOLIC BLOOD PRESSURE: 136 MMHG

## 2017-08-12 VITALS — SYSTOLIC BLOOD PRESSURE: 167 MMHG | DIASTOLIC BLOOD PRESSURE: 90 MMHG

## 2017-08-12 VITALS — DIASTOLIC BLOOD PRESSURE: 58 MMHG | SYSTOLIC BLOOD PRESSURE: 141 MMHG

## 2017-08-12 VITALS — SYSTOLIC BLOOD PRESSURE: 158 MMHG | DIASTOLIC BLOOD PRESSURE: 62 MMHG

## 2017-08-12 VITALS — SYSTOLIC BLOOD PRESSURE: 159 MMHG | DIASTOLIC BLOOD PRESSURE: 68 MMHG

## 2017-08-12 LAB
BUN SERPL-MCNC: 48 MG/DL (ref 7–18)
HCT VFR BLD CALC: 26.9 % (ref 34.6–47.8)
HGB BLD-MCNC: 8.8 G/DL (ref 11.7–16.4)
OCCBLD OBC: (no result)
WBC # BLD AUTO: 13.5 X10^3/UL (ref 3.4–10)

## 2017-08-12 RX ADMIN — INSULIN ASPART SCH UNITS: 100 INJECTION, SOLUTION INTRAVENOUS; SUBCUTANEOUS at 07:45

## 2017-08-12 RX ADMIN — INSULIN ASPART SCH UNITS: 100 INJECTION, SOLUTION INTRAVENOUS; SUBCUTANEOUS at 11:54

## 2017-08-12 RX ADMIN — ATORVASTATIN CALCIUM SCH MG: 40 TABLET, FILM COATED ORAL at 22:04

## 2017-08-12 RX ADMIN — INSULIN DETEMIR SCH UNITS: 100 INJECTION, SOLUTION SUBCUTANEOUS at 17:25

## 2017-08-12 RX ADMIN — PIPERACILLIN AND TAZOBACTAM SCH MLS/HR: 2; .25 INJECTION, POWDER, FOR SOLUTION INTRAVENOUS at 16:05

## 2017-08-12 RX ADMIN — PIPERACILLIN AND TAZOBACTAM SCH MLS/HR: 2; .25 INJECTION, POWDER, FOR SOLUTION INTRAVENOUS at 08:44

## 2017-08-12 RX ADMIN — FAMOTIDINE SCH MG: 20 TABLET, FILM COATED ORAL at 08:30

## 2017-08-12 RX ADMIN — INSULIN DETEMIR SCH UNITS: 100 INJECTION, SOLUTION SUBCUTANEOUS at 06:33

## 2017-08-12 RX ADMIN — INSULIN ASPART SCH UNITS: 100 INJECTION, SOLUTION INTRAVENOUS; SUBCUTANEOUS at 22:05

## 2017-08-12 RX ADMIN — INSULIN ASPART SCH UNITS: 100 INJECTION, SOLUTION INTRAVENOUS; SUBCUTANEOUS at 17:25

## 2017-08-12 RX ADMIN — ASPIRIN 81 MG SCH MG: 81 TABLET ORAL at 08:30

## 2017-08-13 VITALS — DIASTOLIC BLOOD PRESSURE: 66 MMHG | SYSTOLIC BLOOD PRESSURE: 131 MMHG

## 2017-08-13 VITALS — DIASTOLIC BLOOD PRESSURE: 67 MMHG | SYSTOLIC BLOOD PRESSURE: 159 MMHG

## 2017-08-13 VITALS — DIASTOLIC BLOOD PRESSURE: 67 MMHG | SYSTOLIC BLOOD PRESSURE: 132 MMHG

## 2017-08-13 VITALS — SYSTOLIC BLOOD PRESSURE: 126 MMHG | DIASTOLIC BLOOD PRESSURE: 53 MMHG

## 2017-08-13 LAB
BUN SERPL-MCNC: 53 MG/DL (ref 7–18)
HCT VFR BLD CALC: 29.4 % (ref 34.6–47.8)
HGB BLD-MCNC: 9.7 G/DL (ref 11.7–16.4)
WBC # BLD AUTO: 14.6 X10^3/UL (ref 3.4–10)

## 2017-08-13 RX ADMIN — POTASSIUM CHLORIDE SCH MEQ: 20 TABLET, EXTENDED RELEASE ORAL at 20:59

## 2017-08-13 RX ADMIN — INSULIN DETEMIR SCH UNITS: 100 INJECTION, SOLUTION SUBCUTANEOUS at 21:00

## 2017-08-13 RX ADMIN — ASPIRIN 81 MG SCH MG: 81 TABLET ORAL at 09:01

## 2017-08-13 RX ADMIN — INSULIN ASPART SCH UNITS: 100 INJECTION, SOLUTION INTRAVENOUS; SUBCUTANEOUS at 12:03

## 2017-08-13 RX ADMIN — INSULIN ASPART SCH UNITS: 100 INJECTION, SOLUTION INTRAVENOUS; SUBCUTANEOUS at 09:04

## 2017-08-13 RX ADMIN — INSULIN ASPART SCH UNITS: 100 INJECTION, SOLUTION INTRAVENOUS; SUBCUTANEOUS at 16:42

## 2017-08-13 RX ADMIN — PIPERACILLIN AND TAZOBACTAM SCH MLS/HR: 2; .25 INJECTION, POWDER, FOR SOLUTION INTRAVENOUS at 00:01

## 2017-08-13 RX ADMIN — HYDROCODONE BITARTRATE AND ACETAMINOPHEN PRN TAB: 5; 325 TABLET ORAL at 12:09

## 2017-08-13 RX ADMIN — PIPERACILLIN AND TAZOBACTAM SCH MLS/HR: 2; .25 INJECTION, POWDER, FOR SOLUTION INTRAVENOUS at 10:17

## 2017-08-13 RX ADMIN — ATORVASTATIN CALCIUM SCH MG: 40 TABLET, FILM COATED ORAL at 20:59

## 2017-08-13 RX ADMIN — INSULIN DETEMIR SCH UNITS: 100 INJECTION, SOLUTION SUBCUTANEOUS at 09:03

## 2017-08-13 RX ADMIN — INSULIN ASPART SCH UNITS: 100 INJECTION, SOLUTION INTRAVENOUS; SUBCUTANEOUS at 21:00

## 2017-08-13 RX ADMIN — POTASSIUM CHLORIDE SCH MEQ: 20 TABLET, EXTENDED RELEASE ORAL at 09:18

## 2017-08-13 RX ADMIN — HYDROCODONE BITARTRATE AND ACETAMINOPHEN PRN TAB: 5; 325 TABLET ORAL at 19:08

## 2017-08-13 RX ADMIN — APIXABAN SCH MG: 2.5 TABLET, FILM COATED ORAL at 20:59

## 2017-08-13 RX ADMIN — PIPERACILLIN AND TAZOBACTAM SCH MLS/HR: 2; .25 INJECTION, POWDER, FOR SOLUTION INTRAVENOUS at 16:40

## 2017-08-13 RX ADMIN — FAMOTIDINE SCH MG: 20 TABLET, FILM COATED ORAL at 09:01

## 2017-08-14 VITALS — DIASTOLIC BLOOD PRESSURE: 77 MMHG | SYSTOLIC BLOOD PRESSURE: 196 MMHG

## 2017-08-14 VITALS — DIASTOLIC BLOOD PRESSURE: 76 MMHG | SYSTOLIC BLOOD PRESSURE: 172 MMHG

## 2017-08-14 VITALS — SYSTOLIC BLOOD PRESSURE: 128 MMHG | DIASTOLIC BLOOD PRESSURE: 62 MMHG

## 2017-08-14 VITALS — DIASTOLIC BLOOD PRESSURE: 68 MMHG | SYSTOLIC BLOOD PRESSURE: 121 MMHG

## 2017-08-14 LAB
BUN SERPL-MCNC: 53 MG/DL (ref 7–18)
HCT VFR BLD CALC: 28.5 % (ref 34.6–47.8)
HGB BLD-MCNC: 9.4 G/DL (ref 11.7–16.4)
WBC # BLD AUTO: 12.8 X10^3/UL (ref 3.4–10)

## 2017-08-14 RX ADMIN — INSULIN ASPART SCH UNITS: 100 INJECTION, SOLUTION INTRAVENOUS; SUBCUTANEOUS at 19:52

## 2017-08-14 RX ADMIN — ATORVASTATIN CALCIUM SCH MG: 40 TABLET, FILM COATED ORAL at 19:52

## 2017-08-14 RX ADMIN — POTASSIUM CHLORIDE SCH MEQ: 20 TABLET, EXTENDED RELEASE ORAL at 19:52

## 2017-08-14 RX ADMIN — INSULIN ASPART SCH UNITS: 100 INJECTION, SOLUTION INTRAVENOUS; SUBCUTANEOUS at 12:06

## 2017-08-14 RX ADMIN — APIXABAN SCH MG: 2.5 TABLET, FILM COATED ORAL at 08:53

## 2017-08-14 RX ADMIN — FAMOTIDINE SCH MG: 20 TABLET, FILM COATED ORAL at 08:53

## 2017-08-14 RX ADMIN — PIPERACILLIN AND TAZOBACTAM SCH MLS/HR: 2; .25 INJECTION, POWDER, FOR SOLUTION INTRAVENOUS at 00:26

## 2017-08-14 RX ADMIN — APIXABAN SCH MG: 2.5 TABLET, FILM COATED ORAL at 19:52

## 2017-08-14 RX ADMIN — INSULIN ASPART SCH UNITS: 100 INJECTION, SOLUTION INTRAVENOUS; SUBCUTANEOUS at 16:00

## 2017-08-14 RX ADMIN — INSULIN DETEMIR SCH UNITS: 100 INJECTION, SOLUTION SUBCUTANEOUS at 19:53

## 2017-08-14 RX ADMIN — ASPIRIN 81 MG SCH MG: 81 TABLET ORAL at 08:53

## 2017-08-14 RX ADMIN — INSULIN ASPART SCH UNITS: 100 INJECTION, SOLUTION INTRAVENOUS; SUBCUTANEOUS at 08:52

## 2017-08-14 RX ADMIN — HYDROCODONE BITARTRATE AND ACETAMINOPHEN PRN TAB: 5; 325 TABLET ORAL at 19:52

## 2017-08-14 RX ADMIN — INSULIN DETEMIR SCH UNITS: 100 INJECTION, SOLUTION SUBCUTANEOUS at 08:53

## 2017-08-14 RX ADMIN — PIPERACILLIN AND TAZOBACTAM SCH MLS/HR: 2; .25 INJECTION, POWDER, FOR SOLUTION INTRAVENOUS at 08:53

## 2017-08-14 RX ADMIN — POTASSIUM CHLORIDE SCH MEQ: 20 TABLET, EXTENDED RELEASE ORAL at 08:53

## 2017-08-15 VITALS — SYSTOLIC BLOOD PRESSURE: 164 MMHG | DIASTOLIC BLOOD PRESSURE: 78 MMHG

## 2017-08-15 VITALS — SYSTOLIC BLOOD PRESSURE: 137 MMHG | DIASTOLIC BLOOD PRESSURE: 75 MMHG

## 2017-08-15 VITALS — DIASTOLIC BLOOD PRESSURE: 72 MMHG | SYSTOLIC BLOOD PRESSURE: 138 MMHG

## 2017-08-15 VITALS — SYSTOLIC BLOOD PRESSURE: 159 MMHG | DIASTOLIC BLOOD PRESSURE: 72 MMHG

## 2017-08-15 LAB
BUN SERPL-MCNC: 19 MG/DL (ref 7–18)
FERRITIN SERPL-MCNC: 134.7 NG/ML (ref 8–252)
HCT VFR BLD CALC: 27.5 % (ref 34.6–47.8)
HGB BLD-MCNC: 9 G/DL (ref 11.7–16.4)
TIBC SERPL-MCNC: 249 MCG/DL (ref 250–450)
WBC # BLD AUTO: 11.8 X10^3/UL (ref 3.4–10)

## 2017-08-15 RX ADMIN — INSULIN ASPART SCH UNITS: 100 INJECTION, SOLUTION INTRAVENOUS; SUBCUTANEOUS at 20:29

## 2017-08-15 RX ADMIN — APIXABAN SCH MG: 2.5 TABLET, FILM COATED ORAL at 20:29

## 2017-08-15 RX ADMIN — ATORVASTATIN CALCIUM SCH MG: 40 TABLET, FILM COATED ORAL at 20:29

## 2017-08-15 RX ADMIN — INSULIN DETEMIR SCH UNITS: 100 INJECTION, SOLUTION SUBCUTANEOUS at 09:06

## 2017-08-15 RX ADMIN — FAMOTIDINE SCH MG: 20 TABLET, FILM COATED ORAL at 09:06

## 2017-08-15 RX ADMIN — VITAMIN D, TAB 1000IU (100/BT) SCH UNITS: 25 TAB at 20:29

## 2017-08-15 RX ADMIN — INSULIN ASPART SCH UNITS: 100 INJECTION, SOLUTION INTRAVENOUS; SUBCUTANEOUS at 07:00

## 2017-08-15 RX ADMIN — INSULIN ASPART SCH UNITS: 100 INJECTION, SOLUTION INTRAVENOUS; SUBCUTANEOUS at 11:42

## 2017-08-15 RX ADMIN — INSULIN ASPART SCH UNITS: 100 INJECTION, SOLUTION INTRAVENOUS; SUBCUTANEOUS at 16:54

## 2017-08-15 RX ADMIN — INSULIN DETEMIR SCH UNITS: 100 INJECTION, SOLUTION SUBCUTANEOUS at 20:29

## 2017-08-15 RX ADMIN — APIXABAN SCH MG: 2.5 TABLET, FILM COATED ORAL at 09:05

## 2017-08-15 RX ADMIN — ASPIRIN 81 MG SCH MG: 81 TABLET ORAL at 09:05

## 2017-08-16 VITALS — SYSTOLIC BLOOD PRESSURE: 134 MMHG | DIASTOLIC BLOOD PRESSURE: 69 MMHG

## 2017-08-16 VITALS — SYSTOLIC BLOOD PRESSURE: 169 MMHG | DIASTOLIC BLOOD PRESSURE: 81 MMHG

## 2017-08-16 VITALS — SYSTOLIC BLOOD PRESSURE: 136 MMHG | DIASTOLIC BLOOD PRESSURE: 68 MMHG

## 2017-08-16 VITALS — SYSTOLIC BLOOD PRESSURE: 177 MMHG | DIASTOLIC BLOOD PRESSURE: 66 MMHG

## 2017-08-16 LAB
BUN SERPL-MCNC: 35 MG/DL (ref 7–18)
HCT VFR BLD CALC: 28.5 % (ref 34.6–47.8)
HGB BLD-MCNC: 9.3 G/DL (ref 11.7–16.4)
WBC # BLD AUTO: 15 X10^3/UL (ref 3.4–10)

## 2017-08-16 RX ADMIN — ASPIRIN 81 MG SCH MG: 81 TABLET ORAL at 08:58

## 2017-08-16 RX ADMIN — ATORVASTATIN CALCIUM SCH MG: 40 TABLET, FILM COATED ORAL at 20:48

## 2017-08-16 RX ADMIN — APIXABAN SCH MG: 5 TABLET, FILM COATED ORAL at 20:48

## 2017-08-16 RX ADMIN — METOPROLOL TARTRATE SCH MG: 25 TABLET, FILM COATED ORAL at 10:17

## 2017-08-16 RX ADMIN — INSULIN DETEMIR SCH UNITS: 100 INJECTION, SOLUTION SUBCUTANEOUS at 20:58

## 2017-08-16 RX ADMIN — FAMOTIDINE SCH MG: 20 TABLET, FILM COATED ORAL at 08:58

## 2017-08-16 RX ADMIN — APIXABAN SCH MG: 2.5 TABLET, FILM COATED ORAL at 08:59

## 2017-08-16 RX ADMIN — METOPROLOL TARTRATE SCH MG: 25 TABLET, FILM COATED ORAL at 20:48

## 2017-08-16 RX ADMIN — VITAMIN D, TAB 1000IU (100/BT) SCH UNITS: 25 TAB at 08:58

## 2017-08-16 RX ADMIN — INSULIN ASPART SCH UNITS: 100 INJECTION, SOLUTION INTRAVENOUS; SUBCUTANEOUS at 11:58

## 2017-08-16 RX ADMIN — INSULIN DETEMIR SCH UNITS: 100 INJECTION, SOLUTION SUBCUTANEOUS at 08:59

## 2017-08-16 RX ADMIN — INSULIN ASPART SCH UNITS: 100 INJECTION, SOLUTION INTRAVENOUS; SUBCUTANEOUS at 20:58

## 2017-08-16 RX ADMIN — INSULIN ASPART SCH UNITS: 100 INJECTION, SOLUTION INTRAVENOUS; SUBCUTANEOUS at 16:00

## 2017-08-16 RX ADMIN — INSULIN ASPART SCH UNITS: 100 INJECTION, SOLUTION INTRAVENOUS; SUBCUTANEOUS at 09:00

## 2017-08-17 VITALS — SYSTOLIC BLOOD PRESSURE: 161 MMHG | DIASTOLIC BLOOD PRESSURE: 73 MMHG

## 2017-08-17 VITALS — DIASTOLIC BLOOD PRESSURE: 77 MMHG | SYSTOLIC BLOOD PRESSURE: 158 MMHG

## 2017-08-17 VITALS — DIASTOLIC BLOOD PRESSURE: 92 MMHG | SYSTOLIC BLOOD PRESSURE: 167 MMHG

## 2017-08-17 VITALS — DIASTOLIC BLOOD PRESSURE: 75 MMHG | SYSTOLIC BLOOD PRESSURE: 158 MMHG

## 2017-08-17 LAB
BUN SERPL-MCNC: 22 MG/DL (ref 7–18)
HCT VFR BLD CALC: 28.2 % (ref 34.6–47.8)
HGB BLD-MCNC: 9.3 G/DL (ref 11.7–16.4)
WBC # BLD AUTO: 12.6 X10^3/UL (ref 3.4–10)

## 2017-08-17 RX ADMIN — METOPROLOL TARTRATE SCH MG: 25 TABLET, FILM COATED ORAL at 20:17

## 2017-08-17 RX ADMIN — INSULIN ASPART SCH UNITS: 100 INJECTION, SOLUTION INTRAVENOUS; SUBCUTANEOUS at 20:18

## 2017-08-17 RX ADMIN — INSULIN DETEMIR SCH UNITS: 100 INJECTION, SOLUTION SUBCUTANEOUS at 20:18

## 2017-08-17 RX ADMIN — INSULIN ASPART SCH UNITS: 100 INJECTION, SOLUTION INTRAVENOUS; SUBCUTANEOUS at 11:32

## 2017-08-17 RX ADMIN — AMLODIPINE BESYLATE SCH MG: 5 TABLET ORAL at 10:53

## 2017-08-17 RX ADMIN — INSULIN DETEMIR SCH UNITS: 100 INJECTION, SOLUTION SUBCUTANEOUS at 08:52

## 2017-08-17 RX ADMIN — INSULIN ASPART SCH UNITS: 100 INJECTION, SOLUTION INTRAVENOUS; SUBCUTANEOUS at 07:00

## 2017-08-17 RX ADMIN — ASPIRIN 81 MG SCH MG: 81 TABLET ORAL at 08:51

## 2017-08-17 RX ADMIN — APIXABAN SCH MG: 5 TABLET, FILM COATED ORAL at 08:51

## 2017-08-17 RX ADMIN — VITAMIN D, TAB 1000IU (100/BT) SCH UNITS: 25 TAB at 08:51

## 2017-08-17 RX ADMIN — ATORVASTATIN CALCIUM SCH MG: 40 TABLET, FILM COATED ORAL at 20:17

## 2017-08-17 RX ADMIN — FAMOTIDINE SCH MG: 20 TABLET, FILM COATED ORAL at 08:50

## 2017-08-17 RX ADMIN — INSULIN ASPART SCH UNITS: 100 INJECTION, SOLUTION INTRAVENOUS; SUBCUTANEOUS at 16:22

## 2017-08-17 RX ADMIN — METOPROLOL TARTRATE SCH MG: 25 TABLET, FILM COATED ORAL at 08:52

## 2017-08-18 VITALS — SYSTOLIC BLOOD PRESSURE: 122 MMHG | DIASTOLIC BLOOD PRESSURE: 63 MMHG

## 2017-08-18 VITALS — DIASTOLIC BLOOD PRESSURE: 74 MMHG | SYSTOLIC BLOOD PRESSURE: 146 MMHG

## 2017-08-18 VITALS — DIASTOLIC BLOOD PRESSURE: 80 MMHG | SYSTOLIC BLOOD PRESSURE: 165 MMHG

## 2017-08-18 VITALS — DIASTOLIC BLOOD PRESSURE: 65 MMHG | SYSTOLIC BLOOD PRESSURE: 130 MMHG

## 2017-08-18 VITALS — DIASTOLIC BLOOD PRESSURE: 79 MMHG | SYSTOLIC BLOOD PRESSURE: 150 MMHG

## 2017-08-18 LAB
BUN SERPL-MCNC: 35 MG/DL (ref 7–18)
HCT VFR BLD CALC: 28.9 % (ref 34.6–47.8)
HGB BLD-MCNC: 9.5 G/DL (ref 11.7–16.4)
WBC # BLD AUTO: 12.3 X10^3/UL (ref 3.4–10)

## 2017-08-18 RX ADMIN — FAMOTIDINE SCH MG: 20 TABLET, FILM COATED ORAL at 14:59

## 2017-08-18 RX ADMIN — METOPROLOL TARTRATE SCH MG: 25 TABLET, FILM COATED ORAL at 14:59

## 2017-08-18 RX ADMIN — INSULIN ASPART SCH UNITS: 100 INJECTION, SOLUTION INTRAVENOUS; SUBCUTANEOUS at 20:57

## 2017-08-18 RX ADMIN — AMLODIPINE BESYLATE SCH MG: 5 TABLET ORAL at 14:59

## 2017-08-18 RX ADMIN — METOPROLOL TARTRATE SCH MG: 25 TABLET, FILM COATED ORAL at 20:55

## 2017-08-18 RX ADMIN — INSULIN DETEMIR SCH UNITS: 100 INJECTION, SOLUTION SUBCUTANEOUS at 20:57

## 2017-08-18 RX ADMIN — INSULIN ASPART SCH UNITS: 100 INJECTION, SOLUTION INTRAVENOUS; SUBCUTANEOUS at 08:35

## 2017-08-18 RX ADMIN — INSULIN ASPART SCH UNITS: 100 INJECTION, SOLUTION INTRAVENOUS; SUBCUTANEOUS at 11:30

## 2017-08-18 RX ADMIN — INSULIN DETEMIR SCH UNITS: 100 INJECTION, SOLUTION SUBCUTANEOUS at 13:00

## 2017-08-18 RX ADMIN — ATORVASTATIN CALCIUM SCH MG: 40 TABLET, FILM COATED ORAL at 20:55

## 2017-08-18 RX ADMIN — ASPIRIN 81 MG SCH MG: 81 TABLET ORAL at 14:58

## 2017-08-18 RX ADMIN — IRON SUCROSE SCH MG: 20 INJECTION, SOLUTION INTRAVENOUS at 09:00

## 2017-08-18 RX ADMIN — INSULIN ASPART SCH UNITS: 100 INJECTION, SOLUTION INTRAVENOUS; SUBCUTANEOUS at 18:28

## 2017-08-18 RX ADMIN — IRON SUCROSE SCH MG: 20 INJECTION, SOLUTION INTRAVENOUS at 14:58

## 2017-08-18 RX ADMIN — VITAMIN D, TAB 1000IU (100/BT) SCH UNITS: 25 TAB at 14:58

## 2017-08-19 VITALS — DIASTOLIC BLOOD PRESSURE: 75 MMHG | SYSTOLIC BLOOD PRESSURE: 130 MMHG

## 2017-08-19 VITALS — SYSTOLIC BLOOD PRESSURE: 150 MMHG | DIASTOLIC BLOOD PRESSURE: 72 MMHG

## 2017-08-19 VITALS — DIASTOLIC BLOOD PRESSURE: 63 MMHG | SYSTOLIC BLOOD PRESSURE: 141 MMHG

## 2017-08-19 VITALS — SYSTOLIC BLOOD PRESSURE: 123 MMHG | DIASTOLIC BLOOD PRESSURE: 46 MMHG

## 2017-08-19 LAB
BUN SERPL-MCNC: 25 MG/DL (ref 7–18)
HCT VFR BLD CALC: 28 % (ref 34.6–47.8)
HGB BLD-MCNC: 9.1 G/DL (ref 11.7–16.4)
WBC # BLD AUTO: 11.3 X10^3/UL (ref 3.4–10)

## 2017-08-19 PROCEDURE — B548ZZA ULTRASONOGRAPHY OF SUPERIOR VENA CAVA, GUIDANCE: ICD-10-PCS | Performed by: RADIOLOGY

## 2017-08-19 PROCEDURE — 02HV33Z INSERTION OF INFUSION DEVICE INTO SUPERIOR VENA CAVA, PERCUTANEOUS APPROACH: ICD-10-PCS | Performed by: RADIOLOGY

## 2017-08-19 PROCEDURE — 0JH63XZ INSERTION OF TUNNELED VASCULAR ACCESS DEVICE INTO CHEST SUBCUTANEOUS TISSUE AND FASCIA, PERCUTANEOUS APPROACH: ICD-10-PCS | Performed by: RADIOLOGY

## 2017-08-19 RX ADMIN — IRON SUCROSE SCH MG: 20 INJECTION, SOLUTION INTRAVENOUS at 09:38

## 2017-08-19 RX ADMIN — ACETAMINOPHEN PRN MG: 325 TABLET, FILM COATED ORAL at 17:57

## 2017-08-19 RX ADMIN — INSULIN DETEMIR SCH UNITS: 100 INJECTION, SOLUTION SUBCUTANEOUS at 20:56

## 2017-08-19 RX ADMIN — INSULIN ASPART SCH UNITS: 100 INJECTION, SOLUTION INTRAVENOUS; SUBCUTANEOUS at 11:00

## 2017-08-19 RX ADMIN — AMLODIPINE BESYLATE SCH MG: 5 TABLET ORAL at 09:36

## 2017-08-19 RX ADMIN — ASPIRIN 81 MG SCH MG: 81 TABLET ORAL at 09:36

## 2017-08-19 RX ADMIN — VITAMIN D, TAB 1000IU (100/BT) SCH UNITS: 25 TAB at 09:36

## 2017-08-19 RX ADMIN — INSULIN DETEMIR SCH UNITS: 100 INJECTION, SOLUTION SUBCUTANEOUS at 09:36

## 2017-08-19 RX ADMIN — METOPROLOL TARTRATE SCH MG: 25 TABLET, FILM COATED ORAL at 09:37

## 2017-08-19 RX ADMIN — ATORVASTATIN CALCIUM SCH MG: 40 TABLET, FILM COATED ORAL at 20:56

## 2017-08-19 RX ADMIN — METOPROLOL TARTRATE SCH MG: 25 TABLET, FILM COATED ORAL at 20:56

## 2017-08-19 RX ADMIN — INSULIN ASPART SCH UNITS: 100 INJECTION, SOLUTION INTRAVENOUS; SUBCUTANEOUS at 07:00

## 2017-08-19 RX ADMIN — INSULIN ASPART SCH UNITS: 100 INJECTION, SOLUTION INTRAVENOUS; SUBCUTANEOUS at 20:56

## 2017-08-19 RX ADMIN — FAMOTIDINE SCH MG: 20 TABLET, FILM COATED ORAL at 09:36

## 2017-08-19 RX ADMIN — INSULIN ASPART SCH UNITS: 100 INJECTION, SOLUTION INTRAVENOUS; SUBCUTANEOUS at 16:58

## 2017-08-20 VITALS — SYSTOLIC BLOOD PRESSURE: 147 MMHG | DIASTOLIC BLOOD PRESSURE: 61 MMHG

## 2017-08-20 VITALS — SYSTOLIC BLOOD PRESSURE: 135 MMHG | DIASTOLIC BLOOD PRESSURE: 54 MMHG

## 2017-08-20 VITALS — SYSTOLIC BLOOD PRESSURE: 156 MMHG | DIASTOLIC BLOOD PRESSURE: 77 MMHG

## 2017-08-20 VITALS — SYSTOLIC BLOOD PRESSURE: 154 MMHG | DIASTOLIC BLOOD PRESSURE: 62 MMHG

## 2017-08-20 LAB
BUN SERPL-MCNC: 34 MG/DL (ref 7–18)
HCT VFR BLD CALC: 29 % (ref 34.6–47.8)
HGB BLD-MCNC: 9.4 G/DL (ref 11.7–16.4)
WBC # BLD AUTO: 9.6 X10^3/UL (ref 3.4–10)

## 2017-08-20 RX ADMIN — AMLODIPINE BESYLATE SCH MG: 5 TABLET ORAL at 08:33

## 2017-08-20 RX ADMIN — ASPIRIN 81 MG SCH MG: 81 TABLET ORAL at 08:36

## 2017-08-20 RX ADMIN — ACETAMINOPHEN PRN MG: 325 TABLET, FILM COATED ORAL at 07:59

## 2017-08-20 RX ADMIN — INSULIN ASPART SCH UNITS: 100 INJECTION, SOLUTION INTRAVENOUS; SUBCUTANEOUS at 16:57

## 2017-08-20 RX ADMIN — ATORVASTATIN CALCIUM SCH MG: 40 TABLET, FILM COATED ORAL at 21:04

## 2017-08-20 RX ADMIN — VITAMIN D, TAB 1000IU (100/BT) SCH UNITS: 25 TAB at 08:33

## 2017-08-20 RX ADMIN — APIXABAN SCH MG: 2.5 TABLET, FILM COATED ORAL at 21:03

## 2017-08-20 RX ADMIN — INSULIN DETEMIR SCH UNITS: 100 INJECTION, SOLUTION SUBCUTANEOUS at 08:05

## 2017-08-20 RX ADMIN — INSULIN ASPART SCH UNITS: 100 INJECTION, SOLUTION INTRAVENOUS; SUBCUTANEOUS at 21:12

## 2017-08-20 RX ADMIN — INSULIN DETEMIR SCH UNITS: 100 INJECTION, SOLUTION SUBCUTANEOUS at 21:11

## 2017-08-20 RX ADMIN — ACETAMINOPHEN PRN MG: 325 TABLET, FILM COATED ORAL at 22:29

## 2017-08-20 RX ADMIN — METOPROLOL TARTRATE SCH MG: 25 TABLET, FILM COATED ORAL at 08:37

## 2017-08-20 RX ADMIN — METOPROLOL TARTRATE SCH MG: 25 TABLET, FILM COATED ORAL at 21:04

## 2017-08-20 RX ADMIN — INSULIN ASPART SCH UNITS: 100 INJECTION, SOLUTION INTRAVENOUS; SUBCUTANEOUS at 08:04

## 2017-08-20 RX ADMIN — ACETAMINOPHEN PRN MG: 325 TABLET, FILM COATED ORAL at 12:28

## 2017-08-20 RX ADMIN — INSULIN ASPART SCH UNITS: 100 INJECTION, SOLUTION INTRAVENOUS; SUBCUTANEOUS at 11:16

## 2017-08-20 RX ADMIN — FAMOTIDINE SCH MG: 20 TABLET, FILM COATED ORAL at 08:32

## 2017-08-20 RX ADMIN — IRON SUCROSE SCH MG: 20 INJECTION, SOLUTION INTRAVENOUS at 08:30

## 2017-08-20 RX ADMIN — ONDANSETRON PRN MG: 2 INJECTION, SOLUTION INTRAMUSCULAR; INTRAVENOUS at 21:09

## 2017-08-21 VITALS — DIASTOLIC BLOOD PRESSURE: 71 MMHG | SYSTOLIC BLOOD PRESSURE: 155 MMHG

## 2017-08-21 VITALS — SYSTOLIC BLOOD PRESSURE: 141 MMHG | DIASTOLIC BLOOD PRESSURE: 66 MMHG

## 2017-08-21 VITALS — SYSTOLIC BLOOD PRESSURE: 152 MMHG | DIASTOLIC BLOOD PRESSURE: 78 MMHG

## 2017-08-21 VITALS — DIASTOLIC BLOOD PRESSURE: 72 MMHG | SYSTOLIC BLOOD PRESSURE: 132 MMHG

## 2017-08-21 LAB
BUN SERPL-MCNC: 37 MG/DL (ref 7–18)
HCT VFR BLD CALC: 29.5 % (ref 34.6–47.8)
HGB BLD-MCNC: 9.6 G/DL (ref 11.7–16.4)
WBC # BLD AUTO: 10.6 X10^3/UL (ref 3.4–10)

## 2017-08-21 RX ADMIN — INSULIN ASPART SCH UNITS: 100 INJECTION, SOLUTION INTRAVENOUS; SUBCUTANEOUS at 21:02

## 2017-08-21 RX ADMIN — ASPIRIN 81 MG SCH MG: 81 TABLET ORAL at 08:27

## 2017-08-21 RX ADMIN — INSULIN ASPART SCH UNITS: 100 INJECTION, SOLUTION INTRAVENOUS; SUBCUTANEOUS at 07:00

## 2017-08-21 RX ADMIN — IRON SUCROSE SCH MG: 20 INJECTION, SOLUTION INTRAVENOUS at 08:26

## 2017-08-21 RX ADMIN — APIXABAN SCH MG: 2.5 TABLET, FILM COATED ORAL at 08:27

## 2017-08-21 RX ADMIN — APIXABAN SCH MG: 2.5 TABLET, FILM COATED ORAL at 21:01

## 2017-08-21 RX ADMIN — ACETAMINOPHEN PRN MG: 325 TABLET, FILM COATED ORAL at 10:17

## 2017-08-21 RX ADMIN — INSULIN DETEMIR SCH UNITS: 100 INJECTION, SOLUTION SUBCUTANEOUS at 21:02

## 2017-08-21 RX ADMIN — INSULIN ASPART SCH UNITS: 100 INJECTION, SOLUTION INTRAVENOUS; SUBCUTANEOUS at 16:45

## 2017-08-21 RX ADMIN — AMLODIPINE BESYLATE SCH MG: 5 TABLET ORAL at 08:27

## 2017-08-21 RX ADMIN — FAMOTIDINE SCH MG: 20 TABLET, FILM COATED ORAL at 08:27

## 2017-08-21 RX ADMIN — VITAMIN D, TAB 1000IU (100/BT) SCH UNITS: 25 TAB at 08:28

## 2017-08-21 RX ADMIN — ATORVASTATIN CALCIUM SCH MG: 40 TABLET, FILM COATED ORAL at 21:01

## 2017-08-21 RX ADMIN — ACETAMINOPHEN PRN MG: 325 TABLET, FILM COATED ORAL at 16:42

## 2017-08-21 RX ADMIN — METOPROLOL TARTRATE SCH MG: 25 TABLET, FILM COATED ORAL at 08:27

## 2017-08-21 RX ADMIN — INSULIN DETEMIR SCH UNITS: 100 INJECTION, SOLUTION SUBCUTANEOUS at 08:27

## 2017-08-21 RX ADMIN — INSULIN ASPART SCH UNITS: 100 INJECTION, SOLUTION INTRAVENOUS; SUBCUTANEOUS at 11:46

## 2017-08-21 RX ADMIN — METOPROLOL TARTRATE SCH MG: 25 TABLET, FILM COATED ORAL at 21:01

## 2017-08-22 VITALS — DIASTOLIC BLOOD PRESSURE: 77 MMHG | SYSTOLIC BLOOD PRESSURE: 159 MMHG

## 2017-08-22 VITALS — DIASTOLIC BLOOD PRESSURE: 63 MMHG | SYSTOLIC BLOOD PRESSURE: 107 MMHG

## 2017-08-22 VITALS — DIASTOLIC BLOOD PRESSURE: 70 MMHG | SYSTOLIC BLOOD PRESSURE: 158 MMHG

## 2017-08-22 VITALS — SYSTOLIC BLOOD PRESSURE: 118 MMHG | DIASTOLIC BLOOD PRESSURE: 59 MMHG

## 2017-08-22 VITALS — SYSTOLIC BLOOD PRESSURE: 118 MMHG | DIASTOLIC BLOOD PRESSURE: 56 MMHG

## 2017-08-22 LAB
AST SERPL-CCNC: 33 U/L (ref 15–37)
BUN SERPL-MCNC: 33 MG/DL (ref 7–18)
HCT VFR BLD CALC: 31.5 % (ref 34.6–47.8)
HGB BLD-MCNC: 10.1 G/DL (ref 11.7–16.4)
WBC # BLD AUTO: 8.9 X10^3/UL (ref 3.4–10)

## 2017-08-22 RX ADMIN — INSULIN ASPART SCH UNITS: 100 INJECTION, SOLUTION INTRAVENOUS; SUBCUTANEOUS at 16:00

## 2017-08-22 RX ADMIN — INSULIN DETEMIR SCH UNITS: 100 INJECTION, SOLUTION SUBCUTANEOUS at 09:51

## 2017-08-22 RX ADMIN — INSULIN ASPART SCH UNITS: 100 INJECTION, SOLUTION INTRAVENOUS; SUBCUTANEOUS at 07:00

## 2017-08-22 RX ADMIN — TRAZODONE HYDROCHLORIDE PRN MG: 50 TABLET ORAL at 21:52

## 2017-08-22 RX ADMIN — IRON SUCROSE SCH MG: 20 INJECTION, SOLUTION INTRAVENOUS at 09:56

## 2017-08-22 RX ADMIN — MAGNESIUM SULFATE HEPTAHYDRATE ONE MLS/HR: 40 INJECTION, SOLUTION INTRAVENOUS at 10:30

## 2017-08-22 RX ADMIN — ATORVASTATIN CALCIUM SCH MG: 40 TABLET, FILM COATED ORAL at 20:14

## 2017-08-22 RX ADMIN — INSULIN ASPART SCH UNITS: 100 INJECTION, SOLUTION INTRAVENOUS; SUBCUTANEOUS at 20:19

## 2017-08-22 RX ADMIN — VITAMIN D, TAB 1000IU (100/BT) SCH UNITS: 25 TAB at 09:57

## 2017-08-22 RX ADMIN — FAMOTIDINE SCH MG: 20 TABLET, FILM COATED ORAL at 09:56

## 2017-08-22 RX ADMIN — METOPROLOL TARTRATE SCH MG: 25 TABLET, FILM COATED ORAL at 09:55

## 2017-08-22 RX ADMIN — METOPROLOL TARTRATE SCH MG: 25 TABLET, FILM COATED ORAL at 20:16

## 2017-08-22 RX ADMIN — ASPIRIN 81 MG SCH MG: 81 TABLET ORAL at 09:54

## 2017-08-22 RX ADMIN — ACETAMINOPHEN PRN MG: 325 TABLET, FILM COATED ORAL at 17:17

## 2017-08-22 RX ADMIN — INSULIN DETEMIR SCH UNITS: 100 INJECTION, SOLUTION SUBCUTANEOUS at 20:20

## 2017-08-22 RX ADMIN — AMLODIPINE BESYLATE SCH MG: 5 TABLET ORAL at 09:55

## 2017-08-22 RX ADMIN — INSULIN ASPART SCH UNITS: 100 INJECTION, SOLUTION INTRAVENOUS; SUBCUTANEOUS at 12:27

## 2017-08-22 RX ADMIN — APIXABAN SCH MG: 2.5 TABLET, FILM COATED ORAL at 09:55

## 2017-08-22 RX ADMIN — ACETAMINOPHEN PRN MG: 325 TABLET, FILM COATED ORAL at 11:07

## 2017-08-22 RX ADMIN — MAGNESIUM OXIDE SCH MG: 400 TABLET ORAL at 20:14

## 2017-08-22 RX ADMIN — APIXABAN SCH MG: 2.5 TABLET, FILM COATED ORAL at 20:14

## 2017-08-22 RX ADMIN — MAGNESIUM SULFATE HEPTAHYDRATE ONE MLS/HR: 40 INJECTION, SOLUTION INTRAVENOUS at 12:28

## 2017-08-23 VITALS — SYSTOLIC BLOOD PRESSURE: 145 MMHG | DIASTOLIC BLOOD PRESSURE: 70 MMHG

## 2017-08-23 VITALS — DIASTOLIC BLOOD PRESSURE: 72 MMHG | SYSTOLIC BLOOD PRESSURE: 137 MMHG

## 2017-08-23 VITALS — SYSTOLIC BLOOD PRESSURE: 147 MMHG | DIASTOLIC BLOOD PRESSURE: 73 MMHG

## 2017-08-23 VITALS — SYSTOLIC BLOOD PRESSURE: 154 MMHG | DIASTOLIC BLOOD PRESSURE: 70 MMHG

## 2017-08-23 LAB
AST SERPL-CCNC: 19 U/L (ref 15–37)
BUN SERPL-MCNC: 35 MG/DL (ref 7–18)
HCT VFR BLD CALC: 30.3 % (ref 34.6–47.8)
HGB BLD-MCNC: 9.5 G/DL (ref 11.7–16.4)
WBC # BLD AUTO: 8.4 X10^3/UL (ref 3.4–10)

## 2017-08-23 RX ADMIN — ATORVASTATIN CALCIUM SCH MG: 40 TABLET, FILM COATED ORAL at 20:12

## 2017-08-23 RX ADMIN — INSULIN ASPART SCH UNITS: 100 INJECTION, SOLUTION INTRAVENOUS; SUBCUTANEOUS at 07:00

## 2017-08-23 RX ADMIN — METOPROLOL TARTRATE SCH MG: 25 TABLET, FILM COATED ORAL at 20:20

## 2017-08-23 RX ADMIN — INSULIN DETEMIR SCH UNITS: 100 INJECTION, SOLUTION SUBCUTANEOUS at 09:47

## 2017-08-23 RX ADMIN — MAGNESIUM OXIDE SCH MG: 400 TABLET ORAL at 09:45

## 2017-08-23 RX ADMIN — INSULIN DETEMIR SCH UNITS: 100 INJECTION, SOLUTION SUBCUTANEOUS at 20:19

## 2017-08-23 RX ADMIN — VITAMIN D, TAB 1000IU (100/BT) SCH UNITS: 25 TAB at 09:43

## 2017-08-23 RX ADMIN — METOPROLOL TARTRATE SCH MG: 25 TABLET, FILM COATED ORAL at 09:45

## 2017-08-23 RX ADMIN — FAMOTIDINE SCH MG: 20 TABLET, FILM COATED ORAL at 09:46

## 2017-08-23 RX ADMIN — APIXABAN SCH MG: 2.5 TABLET, FILM COATED ORAL at 09:45

## 2017-08-23 RX ADMIN — ASPIRIN 81 MG SCH MG: 81 TABLET ORAL at 09:00

## 2017-08-23 RX ADMIN — INSULIN ASPART SCH UNITS: 100 INJECTION, SOLUTION INTRAVENOUS; SUBCUTANEOUS at 12:37

## 2017-08-23 RX ADMIN — ACETAMINOPHEN PRN MG: 325 TABLET, FILM COATED ORAL at 15:17

## 2017-08-23 RX ADMIN — AMLODIPINE BESYLATE SCH MG: 5 TABLET ORAL at 09:46

## 2017-08-23 RX ADMIN — INSULIN ASPART SCH UNITS: 100 INJECTION, SOLUTION INTRAVENOUS; SUBCUTANEOUS at 17:52

## 2017-08-23 RX ADMIN — ACETAMINOPHEN PRN MG: 325 TABLET, FILM COATED ORAL at 09:41

## 2017-08-23 RX ADMIN — MAGNESIUM OXIDE SCH MG: 400 TABLET ORAL at 20:12

## 2017-08-23 RX ADMIN — INSULIN ASPART SCH UNITS: 100 INJECTION, SOLUTION INTRAVENOUS; SUBCUTANEOUS at 20:19

## 2017-08-24 VITALS — SYSTOLIC BLOOD PRESSURE: 120 MMHG | DIASTOLIC BLOOD PRESSURE: 63 MMHG

## 2017-08-24 VITALS — DIASTOLIC BLOOD PRESSURE: 69 MMHG | SYSTOLIC BLOOD PRESSURE: 144 MMHG

## 2017-08-24 VITALS — DIASTOLIC BLOOD PRESSURE: 73 MMHG | SYSTOLIC BLOOD PRESSURE: 154 MMHG

## 2017-08-24 VITALS — SYSTOLIC BLOOD PRESSURE: 130 MMHG | DIASTOLIC BLOOD PRESSURE: 72 MMHG

## 2017-08-24 LAB
BUN SERPL-MCNC: 37 MG/DL (ref 7–18)
HCT VFR BLD CALC: 30.1 % (ref 34.6–47.8)
HGB BLD-MCNC: 9.8 G/DL (ref 11.7–16.4)
WBC # BLD AUTO: 8.7 X10^3/UL (ref 3.4–10)

## 2017-08-24 RX ADMIN — ACETAMINOPHEN PRN MG: 325 TABLET, FILM COATED ORAL at 16:15

## 2017-08-24 RX ADMIN — METOPROLOL TARTRATE SCH MG: 25 TABLET, FILM COATED ORAL at 21:00

## 2017-08-24 RX ADMIN — FAMOTIDINE SCH MG: 20 TABLET, FILM COATED ORAL at 09:39

## 2017-08-24 RX ADMIN — INSULIN ASPART SCH UNITS: 100 INJECTION, SOLUTION INTRAVENOUS; SUBCUTANEOUS at 07:00

## 2017-08-24 RX ADMIN — ATORVASTATIN CALCIUM SCH MG: 40 TABLET, FILM COATED ORAL at 19:42

## 2017-08-24 RX ADMIN — MAGNESIUM OXIDE SCH MG: 400 TABLET ORAL at 09:39

## 2017-08-24 RX ADMIN — ACETAMINOPHEN PRN MG: 325 TABLET, FILM COATED ORAL at 11:48

## 2017-08-24 RX ADMIN — AMLODIPINE BESYLATE SCH MG: 5 TABLET ORAL at 09:39

## 2017-08-24 RX ADMIN — INSULIN ASPART SCH UNITS: 100 INJECTION, SOLUTION INTRAVENOUS; SUBCUTANEOUS at 19:43

## 2017-08-24 RX ADMIN — ACETAMINOPHEN PRN MG: 325 TABLET, FILM COATED ORAL at 21:58

## 2017-08-24 RX ADMIN — MAGNESIUM OXIDE SCH MG: 400 TABLET ORAL at 19:42

## 2017-08-24 RX ADMIN — ASPIRIN 81 MG SCH MG: 81 TABLET ORAL at 09:39

## 2017-08-24 RX ADMIN — VITAMIN D, TAB 1000IU (100/BT) SCH UNITS: 25 TAB at 09:39

## 2017-08-24 RX ADMIN — INSULIN ASPART SCH UNITS: 100 INJECTION, SOLUTION INTRAVENOUS; SUBCUTANEOUS at 16:14

## 2017-08-24 RX ADMIN — INSULIN ASPART SCH UNITS: 100 INJECTION, SOLUTION INTRAVENOUS; SUBCUTANEOUS at 11:53

## 2017-08-24 RX ADMIN — INSULIN DETEMIR SCH UNITS: 100 INJECTION, SOLUTION SUBCUTANEOUS at 09:38

## 2017-08-24 RX ADMIN — INSULIN DETEMIR SCH UNITS: 100 INJECTION, SOLUTION SUBCUTANEOUS at 19:43

## 2017-08-24 RX ADMIN — METOPROLOL TARTRATE SCH MG: 25 TABLET, FILM COATED ORAL at 09:40

## 2017-08-25 VITALS — DIASTOLIC BLOOD PRESSURE: 67 MMHG | SYSTOLIC BLOOD PRESSURE: 147 MMHG

## 2017-08-25 VITALS — SYSTOLIC BLOOD PRESSURE: 164 MMHG | DIASTOLIC BLOOD PRESSURE: 77 MMHG

## 2017-08-25 VITALS — SYSTOLIC BLOOD PRESSURE: 137 MMHG | DIASTOLIC BLOOD PRESSURE: 71 MMHG

## 2017-08-25 LAB
BUN SERPL-MCNC: 39 MG/DL (ref 7–18)
HCT VFR BLD CALC: 29.5 % (ref 34.6–47.8)
HGB BLD-MCNC: 9.5 G/DL (ref 11.7–16.4)
WBC # BLD AUTO: 10.3 X10^3/UL (ref 3.4–10)

## 2017-08-25 PROCEDURE — 0JPTXXZ REMOVAL OF TUNNELED VASCULAR ACCESS DEVICE FROM TRUNK SUBCUTANEOUS TISSUE AND FASCIA, EXTERNAL APPROACH: ICD-10-PCS | Performed by: RADIOLOGY

## 2017-08-25 PROCEDURE — 02PYX3Z REMOVAL OF INFUSION DEVICE FROM GREAT VESSEL, EXTERNAL APPROACH: ICD-10-PCS | Performed by: RADIOLOGY

## 2017-08-25 RX ADMIN — ACETAMINOPHEN PRN MG: 325 TABLET, FILM COATED ORAL at 02:17

## 2017-08-25 RX ADMIN — ASPIRIN 81 MG SCH MG: 81 TABLET ORAL at 09:49

## 2017-08-25 RX ADMIN — INSULIN ASPART SCH UNITS: 100 INJECTION, SOLUTION INTRAVENOUS; SUBCUTANEOUS at 07:00

## 2017-08-25 RX ADMIN — INSULIN ASPART SCH UNITS: 100 INJECTION, SOLUTION INTRAVENOUS; SUBCUTANEOUS at 12:37

## 2017-08-25 RX ADMIN — INSULIN DETEMIR SCH UNITS: 100 INJECTION, SOLUTION SUBCUTANEOUS at 09:43

## 2017-08-25 RX ADMIN — ACETAMINOPHEN PRN MG: 325 TABLET, FILM COATED ORAL at 09:44

## 2017-08-25 RX ADMIN — FAMOTIDINE SCH MG: 20 TABLET, FILM COATED ORAL at 09:44

## 2017-08-25 RX ADMIN — MAGNESIUM OXIDE SCH MG: 400 TABLET ORAL at 09:44

## 2017-08-25 RX ADMIN — METOPROLOL TARTRATE SCH MG: 25 TABLET, FILM COATED ORAL at 09:44

## 2017-08-25 RX ADMIN — VITAMIN D, TAB 1000IU (100/BT) SCH UNITS: 25 TAB at 09:43

## 2017-08-25 RX ADMIN — AMLODIPINE BESYLATE SCH MG: 5 TABLET ORAL at 09:44

## 2017-08-30 ENCOUNTER — HOSPITAL ENCOUNTER (INPATIENT)
Dept: HOSPITAL 8 - ED | Age: 72
LOS: 2 days | Discharge: HOME | DRG: 542 | End: 2017-09-01
Attending: INTERNAL MEDICINE | Admitting: INTERNAL MEDICINE
Payer: MEDICARE

## 2017-08-30 VITALS — DIASTOLIC BLOOD PRESSURE: 81 MMHG | SYSTOLIC BLOOD PRESSURE: 152 MMHG

## 2017-08-30 VITALS — BODY MASS INDEX: 28.8 KG/M2 | HEIGHT: 59 IN | WEIGHT: 142.86 LBS

## 2017-08-30 VITALS — DIASTOLIC BLOOD PRESSURE: 71 MMHG | SYSTOLIC BLOOD PRESSURE: 138 MMHG

## 2017-08-30 VITALS — DIASTOLIC BLOOD PRESSURE: 72 MMHG | SYSTOLIC BLOOD PRESSURE: 149 MMHG

## 2017-08-30 DIAGNOSIS — E44.0: ICD-10-CM

## 2017-08-30 DIAGNOSIS — E43: ICD-10-CM

## 2017-08-30 DIAGNOSIS — E11.649: ICD-10-CM

## 2017-08-30 DIAGNOSIS — Z90.710: ICD-10-CM

## 2017-08-30 DIAGNOSIS — I13.2: ICD-10-CM

## 2017-08-30 DIAGNOSIS — K76.0: ICD-10-CM

## 2017-08-30 DIAGNOSIS — M48.07: ICD-10-CM

## 2017-08-30 DIAGNOSIS — Z99.2: ICD-10-CM

## 2017-08-30 DIAGNOSIS — M48.55XA: Primary | ICD-10-CM

## 2017-08-30 DIAGNOSIS — R74.0: ICD-10-CM

## 2017-08-30 DIAGNOSIS — Z83.3: ICD-10-CM

## 2017-08-30 DIAGNOSIS — Z79.899: ICD-10-CM

## 2017-08-30 DIAGNOSIS — I50.9: ICD-10-CM

## 2017-08-30 DIAGNOSIS — E78.5: ICD-10-CM

## 2017-08-30 DIAGNOSIS — Z79.4: ICD-10-CM

## 2017-08-30 DIAGNOSIS — Z79.01: ICD-10-CM

## 2017-08-30 DIAGNOSIS — Z87.891: ICD-10-CM

## 2017-08-30 DIAGNOSIS — Z90.49: ICD-10-CM

## 2017-08-30 DIAGNOSIS — Z79.82: ICD-10-CM

## 2017-08-30 DIAGNOSIS — N20.0: ICD-10-CM

## 2017-08-30 DIAGNOSIS — Z87.01: ICD-10-CM

## 2017-08-30 DIAGNOSIS — N18.5: ICD-10-CM

## 2017-08-30 DIAGNOSIS — I48.0: ICD-10-CM

## 2017-08-30 DIAGNOSIS — M48.56XA: ICD-10-CM

## 2017-08-30 DIAGNOSIS — E11.65: ICD-10-CM

## 2017-08-30 DIAGNOSIS — K59.00: ICD-10-CM

## 2017-08-30 DIAGNOSIS — M51.17: ICD-10-CM

## 2017-08-30 DIAGNOSIS — E11.22: ICD-10-CM

## 2017-08-30 LAB
AST SERPL-CCNC: 20 U/L (ref 15–37)
BUN SERPL-MCNC: 30 MG/DL (ref 7–18)
HCT VFR BLD CALC: 32.7 % (ref 34.6–47.8)
HGB BLD-MCNC: 10.4 G/DL (ref 11.7–16.4)
POTASSIUM UR-SCNC: 32 MMOL/L
WBC # BLD AUTO: 9.5 X10^3/UL (ref 3.4–10)

## 2017-08-30 PROCEDURE — 86140 C-REACTIVE PROTEIN: CPT

## 2017-08-30 PROCEDURE — 84133 ASSAY OF URINE POTASSIUM: CPT

## 2017-08-30 PROCEDURE — 84300 ASSAY OF URINE SODIUM: CPT

## 2017-08-30 PROCEDURE — 87186 SC STD MICRODIL/AGAR DIL: CPT

## 2017-08-30 PROCEDURE — 85651 RBC SED RATE NONAUTOMATED: CPT

## 2017-08-30 PROCEDURE — 84132 ASSAY OF SERUM POTASSIUM: CPT

## 2017-08-30 PROCEDURE — 74176 CT ABD & PELVIS W/O CONTRAST: CPT

## 2017-08-30 PROCEDURE — 83690 ASSAY OF LIPASE: CPT

## 2017-08-30 PROCEDURE — 82570 ASSAY OF URINE CREATININE: CPT

## 2017-08-30 PROCEDURE — 82962 GLUCOSE BLOOD TEST: CPT

## 2017-08-30 PROCEDURE — 80053 COMPREHEN METABOLIC PANEL: CPT

## 2017-08-30 PROCEDURE — 87077 CULTURE AEROBIC IDENTIFY: CPT

## 2017-08-30 PROCEDURE — 80076 HEPATIC FUNCTION PANEL: CPT

## 2017-08-30 PROCEDURE — 87086 URINE CULTURE/COLONY COUNT: CPT

## 2017-08-30 PROCEDURE — 82436 ASSAY OF URINE CHLORIDE: CPT

## 2017-08-30 PROCEDURE — 72148 MRI LUMBAR SPINE W/O DYE: CPT

## 2017-08-30 PROCEDURE — 99285 EMERGENCY DEPT VISIT HI MDM: CPT

## 2017-08-30 PROCEDURE — 81001 URINALYSIS AUTO W/SCOPE: CPT

## 2017-08-30 PROCEDURE — 85025 COMPLETE CBC W/AUTO DIFF WBC: CPT

## 2017-08-30 PROCEDURE — 82040 ASSAY OF SERUM ALBUMIN: CPT

## 2017-08-30 PROCEDURE — 36415 COLL VENOUS BLD VENIPUNCTURE: CPT

## 2017-08-30 PROCEDURE — 76700 US EXAM ABDOM COMPLETE: CPT

## 2017-08-30 PROCEDURE — 80048 BASIC METABOLIC PNL TOTAL CA: CPT

## 2017-08-30 RX ADMIN — SODIUM CHLORIDE, PRESERVATIVE FREE SCH ML: 5 INJECTION INTRAVENOUS at 21:20

## 2017-08-30 RX ADMIN — SODIUM CHLORIDE SCH MLS/HR: 0.9 INJECTION, SOLUTION INTRAVENOUS at 21:20

## 2017-08-30 RX ADMIN — INSULIN ASPART SCH UNITS: 100 INJECTION, SOLUTION INTRAVENOUS; SUBCUTANEOUS at 11:00

## 2017-08-30 RX ADMIN — SODIUM CHLORIDE SCH MLS/HR: 0.9 INJECTION, SOLUTION INTRAVENOUS at 10:40

## 2017-08-30 RX ADMIN — INSULIN ASPART SCH UNITS: 100 INJECTION, SOLUTION INTRAVENOUS; SUBCUTANEOUS at 16:00

## 2017-08-30 RX ADMIN — METOPROLOL TARTRATE SCH MG: 25 TABLET, FILM COATED ORAL at 21:19

## 2017-08-30 RX ADMIN — HYDROCODONE BITARTRATE AND ACETAMINOPHEN PRN TAB: 5; 325 TABLET ORAL at 21:33

## 2017-08-30 RX ADMIN — APIXABAN SCH MG: 5 TABLET, FILM COATED ORAL at 21:19

## 2017-08-30 RX ADMIN — MAGNESIUM OXIDE SCH MG: 400 TABLET ORAL at 21:19

## 2017-08-30 RX ADMIN — INSULIN ASPART SCH UNITS: 100 INJECTION, SOLUTION INTRAVENOUS; SUBCUTANEOUS at 21:21

## 2017-08-31 VITALS — DIASTOLIC BLOOD PRESSURE: 76 MMHG | SYSTOLIC BLOOD PRESSURE: 156 MMHG

## 2017-08-31 VITALS — DIASTOLIC BLOOD PRESSURE: 55 MMHG | SYSTOLIC BLOOD PRESSURE: 121 MMHG

## 2017-08-31 VITALS — SYSTOLIC BLOOD PRESSURE: 159 MMHG | DIASTOLIC BLOOD PRESSURE: 75 MMHG

## 2017-08-31 VITALS — DIASTOLIC BLOOD PRESSURE: 72 MMHG | SYSTOLIC BLOOD PRESSURE: 138 MMHG

## 2017-08-31 LAB
AST SERPL-CCNC: 117 U/L (ref 15–37)
BUN SERPL-MCNC: 26 MG/DL (ref 7–18)
HCT VFR BLD CALC: 30.9 % (ref 34.6–47.8)
HGB BLD-MCNC: 9.7 G/DL (ref 11.7–16.4)
WBC # BLD AUTO: 7.5 X10^3/UL (ref 3.4–10)

## 2017-08-31 RX ADMIN — ASPIRIN SCH MG: 81 TABLET, COATED ORAL at 08:23

## 2017-08-31 RX ADMIN — SODIUM CHLORIDE, PRESERVATIVE FREE SCH ML: 5 INJECTION INTRAVENOUS at 08:26

## 2017-08-31 RX ADMIN — METOPROLOL TARTRATE SCH MG: 25 TABLET, FILM COATED ORAL at 08:23

## 2017-08-31 RX ADMIN — MAGNESIUM OXIDE SCH MG: 400 TABLET ORAL at 08:23

## 2017-08-31 RX ADMIN — APIXABAN SCH MG: 5 TABLET, FILM COATED ORAL at 21:22

## 2017-08-31 RX ADMIN — HYDROCODONE BITARTRATE AND ACETAMINOPHEN PRN TAB: 5; 325 TABLET ORAL at 05:55

## 2017-08-31 RX ADMIN — INSULIN ASPART SCH UNITS: 100 INJECTION, SOLUTION INTRAVENOUS; SUBCUTANEOUS at 21:23

## 2017-08-31 RX ADMIN — INSULIN ASPART SCH UNITS: 100 INJECTION, SOLUTION INTRAVENOUS; SUBCUTANEOUS at 16:05

## 2017-08-31 RX ADMIN — INSULIN ASPART SCH UNITS: 100 INJECTION, SOLUTION INTRAVENOUS; SUBCUTANEOUS at 10:52

## 2017-08-31 RX ADMIN — AMLODIPINE BESYLATE SCH MG: 5 TABLET ORAL at 08:23

## 2017-08-31 RX ADMIN — METOPROLOL TARTRATE SCH MG: 25 TABLET, FILM COATED ORAL at 21:23

## 2017-08-31 RX ADMIN — VITAMIN D, TAB 1000IU (100/BT) SCH UNITS: 25 TAB at 08:23

## 2017-08-31 RX ADMIN — MAGNESIUM OXIDE SCH MG: 400 TABLET ORAL at 21:22

## 2017-08-31 RX ADMIN — SODIUM CHLORIDE, PRESERVATIVE FREE SCH ML: 5 INJECTION INTRAVENOUS at 21:23

## 2017-08-31 RX ADMIN — APIXABAN SCH MG: 5 TABLET, FILM COATED ORAL at 08:23

## 2017-08-31 RX ADMIN — HYDROCODONE BITARTRATE AND ACETAMINOPHEN PRN TAB: 5; 325 TABLET ORAL at 21:33

## 2017-08-31 RX ADMIN — HYDROCODONE BITARTRATE AND ACETAMINOPHEN PRN TAB: 5; 325 TABLET ORAL at 14:41

## 2017-08-31 RX ADMIN — INSULIN ASPART SCH UNITS: 100 INJECTION, SOLUTION INTRAVENOUS; SUBCUTANEOUS at 07:00

## 2017-09-01 VITALS — SYSTOLIC BLOOD PRESSURE: 122 MMHG | DIASTOLIC BLOOD PRESSURE: 49 MMHG

## 2017-09-01 VITALS — SYSTOLIC BLOOD PRESSURE: 156 MMHG | DIASTOLIC BLOOD PRESSURE: 75 MMHG

## 2017-09-01 LAB
AST SERPL-CCNC: 48 U/L (ref 15–37)
BUN SERPL-MCNC: 26 MG/DL (ref 7–18)
HCT VFR BLD CALC: 32.7 % (ref 34.6–47.8)
HGB BLD-MCNC: 10.5 G/DL (ref 11.7–16.4)
WBC # BLD AUTO: 7.6 X10^3/UL (ref 3.4–10)

## 2017-09-01 RX ADMIN — AMLODIPINE BESYLATE SCH MG: 5 TABLET ORAL at 09:56

## 2017-09-01 RX ADMIN — SODIUM CHLORIDE, PRESERVATIVE FREE SCH ML: 5 INJECTION INTRAVENOUS at 09:58

## 2017-09-01 RX ADMIN — ASPIRIN SCH MG: 81 TABLET, COATED ORAL at 09:58

## 2017-09-01 RX ADMIN — VITAMIN D, TAB 1000IU (100/BT) SCH UNITS: 25 TAB at 09:57

## 2017-09-01 RX ADMIN — HYDROCODONE BITARTRATE AND ACETAMINOPHEN PRN TAB: 5; 325 TABLET ORAL at 09:56

## 2017-09-01 RX ADMIN — METOPROLOL TARTRATE SCH MG: 25 TABLET, FILM COATED ORAL at 09:58

## 2017-09-01 RX ADMIN — INSULIN ASPART SCH UNITS: 100 INJECTION, SOLUTION INTRAVENOUS; SUBCUTANEOUS at 09:54

## 2017-09-01 RX ADMIN — INSULIN ASPART SCH UNITS: 100 INJECTION, SOLUTION INTRAVENOUS; SUBCUTANEOUS at 12:48

## 2017-09-01 RX ADMIN — MAGNESIUM OXIDE SCH MG: 400 TABLET ORAL at 09:57

## 2017-09-01 RX ADMIN — APIXABAN SCH MG: 5 TABLET, FILM COATED ORAL at 09:57

## 2018-05-27 ENCOUNTER — HOSPITAL ENCOUNTER (INPATIENT)
Dept: HOSPITAL 8 - ED | Age: 73
LOS: 3 days | Discharge: HOME | DRG: 183 | End: 2018-05-30
Attending: INTERNAL MEDICINE | Admitting: INTERNAL MEDICINE
Payer: MEDICARE

## 2018-05-27 VITALS — HEIGHT: 60 IN | BODY MASS INDEX: 31.16 KG/M2 | WEIGHT: 158.73 LBS
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
## 2018-05-27 DIAGNOSIS — D64.9: ICD-10-CM

## 2018-05-27 DIAGNOSIS — Z83.3: ICD-10-CM

## 2018-05-27 DIAGNOSIS — I42.9: ICD-10-CM

## 2018-05-27 DIAGNOSIS — I48.0: ICD-10-CM

## 2018-05-27 DIAGNOSIS — D68.69: ICD-10-CM

## 2018-05-27 DIAGNOSIS — E78.5: ICD-10-CM

## 2018-05-27 DIAGNOSIS — N18.4: ICD-10-CM

## 2018-05-27 DIAGNOSIS — S22.080A: ICD-10-CM

## 2018-05-27 DIAGNOSIS — J98.11: ICD-10-CM

## 2018-05-27 DIAGNOSIS — E87.1: ICD-10-CM

## 2018-05-27 DIAGNOSIS — Z79.4: ICD-10-CM

## 2018-05-27 DIAGNOSIS — Z90.710: ICD-10-CM

## 2018-05-27 DIAGNOSIS — E43: ICD-10-CM
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
## 2018-05-27 DIAGNOSIS — Z87.891: ICD-10-CM

## 2018-05-27 DIAGNOSIS — I13.0: ICD-10-CM

## 2018-05-27 DIAGNOSIS — E11.22: ICD-10-CM

## 2018-05-27 DIAGNOSIS — I50.9: ICD-10-CM

## 2018-05-27 DIAGNOSIS — S22.41XA: Primary | ICD-10-CM

## 2018-05-27 DIAGNOSIS — E11.65: ICD-10-CM

## 2018-05-27 LAB
ALBUMIN SERPL-MCNC: 2.8 G/DL (ref 3.4–5)
ALP SERPL-CCNC: 73 U/L (ref 45–117)
ALT SERPL-CCNC: 18 U/L (ref 12–78)
ANION GAP SERPL CALC-SCNC: 9 MMOL/L (ref 5–15)
BASOPHILS # BLD AUTO: 0.01 X10^3/UL (ref 0–0.1)
BASOPHILS NFR BLD AUTO: 0 % (ref 0–1)
BILIRUB SERPL-MCNC: 0.5 MG/DL (ref 0.2–1)
CALCIUM SERPL-MCNC: 7.9 MG/DL (ref 8.5–10.1)
CHLORIDE SERPL-SCNC: 99 MMOL/L (ref 98–107)
CREAT SERPL-MCNC: 2.3 MG/DL (ref 0.55–1.02)
EOSINOPHIL # BLD AUTO: 0.03 X10^3/UL (ref 0–0.4)
EOSINOPHIL NFR BLD AUTO: 0 % (ref 1–7)
ERYTHROCYTE [DISTWIDTH] IN BLOOD BY AUTOMATED COUNT: 13.6 % (ref 9.6–15.2)
LYMPHOCYTES # BLD AUTO: 1.15 X10^3/UL (ref 1–3.4)
LYMPHOCYTES NFR BLD AUTO: 12 % (ref 22–44)
MCH RBC QN AUTO: 28.3 PG (ref 27–34.8)
MCHC RBC AUTO-ENTMCNC: 33 G/DL (ref 32.4–35.8)
MCV RBC AUTO: 85.8 FL (ref 80–100)
MD: NO
MONOCYTES # BLD AUTO: 0.58 X10^3/UL (ref 0.2–0.8)
MONOCYTES NFR BLD AUTO: 6 % (ref 2–9)
NEUTROPHILS # BLD AUTO: 8.12 X10^3/UL (ref 1.8–6.8)
NEUTROPHILS NFR BLD AUTO: 82 % (ref 42–75)
PLATELET # BLD AUTO: 239 X10^3/UL (ref 130–400)
PMV BLD AUTO: 11.3 FL (ref 7.4–10.4)
PROT SERPL-MCNC: 6.6 G/DL (ref 6.4–8.2)
RBC # BLD AUTO: 4.17 X10^6/UL (ref 3.82–5.3)

## 2018-05-27 PROCEDURE — 84100 ASSAY OF PHOSPHORUS: CPT

## 2018-05-27 PROCEDURE — 80053 COMPREHEN METABOLIC PANEL: CPT

## 2018-05-27 PROCEDURE — 82962 GLUCOSE BLOOD TEST: CPT

## 2018-05-27 PROCEDURE — 83735 ASSAY OF MAGNESIUM: CPT

## 2018-05-27 PROCEDURE — 81001 URINALYSIS AUTO W/SCOPE: CPT

## 2018-05-27 PROCEDURE — 99285 EMERGENCY DEPT VISIT HI MDM: CPT

## 2018-05-27 PROCEDURE — 36415 COLL VENOUS BLD VENIPUNCTURE: CPT

## 2018-05-27 PROCEDURE — 87086 URINE CULTURE/COLONY COUNT: CPT

## 2018-05-27 PROCEDURE — 83036 HEMOGLOBIN GLYCOSYLATED A1C: CPT

## 2018-05-27 PROCEDURE — 85025 COMPLETE CBC W/AUTO DIFF WBC: CPT

## 2018-05-27 PROCEDURE — 93005 ELECTROCARDIOGRAM TRACING: CPT

## 2018-05-27 PROCEDURE — 80048 BASIC METABOLIC PNL TOTAL CA: CPT

## 2018-05-27 PROCEDURE — 70450 CT HEAD/BRAIN W/O DYE: CPT

## 2018-05-28 VITALS — DIASTOLIC BLOOD PRESSURE: 56 MMHG | SYSTOLIC BLOOD PRESSURE: 128 MMHG

## 2018-05-28 VITALS — SYSTOLIC BLOOD PRESSURE: 150 MMHG | DIASTOLIC BLOOD PRESSURE: 80 MMHG

## 2018-05-28 VITALS — DIASTOLIC BLOOD PRESSURE: 69 MMHG | SYSTOLIC BLOOD PRESSURE: 153 MMHG

## 2018-05-28 VITALS — DIASTOLIC BLOOD PRESSURE: 80 MMHG | SYSTOLIC BLOOD PRESSURE: 154 MMHG

## 2018-05-28 VITALS — SYSTOLIC BLOOD PRESSURE: 134 MMHG | DIASTOLIC BLOOD PRESSURE: 65 MMHG

## 2018-05-28 LAB
ANION GAP SERPL CALC-SCNC: 8 MMOL/L (ref 5–15)
BASOPHILS # BLD AUTO: 0.03 X10^3/UL (ref 0–0.1)
BASOPHILS NFR BLD AUTO: 0 % (ref 0–1)
CALCIUM SERPL-MCNC: 7.6 MG/DL (ref 8.5–10.1)
CHLORIDE SERPL-SCNC: 103 MMOL/L (ref 98–107)
CREAT SERPL-MCNC: 2.26 MG/DL (ref 0.55–1.02)
CULTURE INDICATED?: YES
EOSINOPHIL # BLD AUTO: 0.02 X10^3/UL (ref 0–0.4)
EOSINOPHIL NFR BLD AUTO: 0 % (ref 1–7)
ERYTHROCYTE [DISTWIDTH] IN BLOOD BY AUTOMATED COUNT: 13.4 % (ref 9.6–15.2)
EST. AVERAGE GLUCOSE BLD GHB EST-MCNC: 229 MG/DL (ref 0–126)
HBA1C MFR BLD: 9.6 % (ref 4.2–6.3)
LYMPHOCYTES # BLD AUTO: 1.62 X10^3/UL (ref 1–3.4)
LYMPHOCYTES NFR BLD AUTO: 19 % (ref 22–44)
MCH RBC QN AUTO: 28.8 PG (ref 27–34.8)
MCHC RBC AUTO-ENTMCNC: 33.3 G/DL (ref 32.4–35.8)
MCV RBC AUTO: 86.4 FL (ref 80–100)
MD: NO
MICROSCOPIC: (no result)
MONOCYTES # BLD AUTO: 0.76 X10^3/UL (ref 0.2–0.8)
MONOCYTES NFR BLD AUTO: 9 % (ref 2–9)
NEUTROPHILS # BLD AUTO: 6.11 X10^3/UL (ref 1.8–6.8)
NEUTROPHILS NFR BLD AUTO: 72 % (ref 42–75)
PLATELET # BLD AUTO: 221 X10^3/UL (ref 130–400)
PMV BLD AUTO: 11.1 FL (ref 7.4–10.4)
RBC # BLD AUTO: 3.97 X10^6/UL (ref 3.82–5.3)

## 2018-05-28 RX ADMIN — SODIUM CHLORIDE SCH MLS/HR: 0.9 INJECTION, SOLUTION INTRAVENOUS at 15:39

## 2018-05-28 RX ADMIN — ACETAMINOPHEN PRN MG: 325 TABLET, FILM COATED ORAL at 18:21

## 2018-05-28 RX ADMIN — ATORVASTATIN CALCIUM SCH MG: 40 TABLET, FILM COATED ORAL at 21:02

## 2018-05-28 RX ADMIN — INSULIN LISPRO SCH UNITS: 100 INJECTION, SOLUTION INTRAVENOUS; SUBCUTANEOUS at 03:11

## 2018-05-28 RX ADMIN — MAGNESIUM OXIDE SCH MG: 400 TABLET ORAL at 07:53

## 2018-05-28 RX ADMIN — INSULIN GLARGINE SCH UNITS: 100 INJECTION, SOLUTION SUBCUTANEOUS at 21:42

## 2018-05-28 RX ADMIN — ACETAMINOPHEN PRN MG: 325 TABLET, FILM COATED ORAL at 08:35

## 2018-05-28 RX ADMIN — POLYETHYLENE GLYCOL 3350 SCH GM: 17 POWDER, FOR SOLUTION ORAL at 07:53

## 2018-05-28 RX ADMIN — SODIUM CHLORIDE SCH MLS/HR: 0.9 INJECTION, SOLUTION INTRAVENOUS at 03:10

## 2018-05-28 RX ADMIN — INSULIN LISPRO SCH UNITS: 100 INJECTION, SOLUTION INTRAVENOUS; SUBCUTANEOUS at 17:05

## 2018-05-28 RX ADMIN — MAGNESIUM OXIDE SCH MG: 400 TABLET ORAL at 21:02

## 2018-05-28 RX ADMIN — METOPROLOL TARTRATE SCH MG: 25 TABLET, FILM COATED ORAL at 07:53

## 2018-05-28 RX ADMIN — INSULIN LISPRO SCH UNITS: 100 INJECTION, SOLUTION INTRAVENOUS; SUBCUTANEOUS at 21:42

## 2018-05-28 RX ADMIN — METOPROLOL TARTRATE SCH MG: 25 TABLET, FILM COATED ORAL at 21:02

## 2018-05-28 RX ADMIN — INSULIN LISPRO SCH UNITS: 100 INJECTION, SOLUTION INTRAVENOUS; SUBCUTANEOUS at 07:52

## 2018-05-28 RX ADMIN — INSULIN LISPRO SCH UNITS: 100 INJECTION, SOLUTION INTRAVENOUS; SUBCUTANEOUS at 11:00

## 2018-05-29 VITALS — DIASTOLIC BLOOD PRESSURE: 71 MMHG | SYSTOLIC BLOOD PRESSURE: 147 MMHG

## 2018-05-29 VITALS — SYSTOLIC BLOOD PRESSURE: 126 MMHG | DIASTOLIC BLOOD PRESSURE: 53 MMHG

## 2018-05-29 VITALS — DIASTOLIC BLOOD PRESSURE: 76 MMHG | SYSTOLIC BLOOD PRESSURE: 156 MMHG

## 2018-05-29 VITALS — DIASTOLIC BLOOD PRESSURE: 78 MMHG | SYSTOLIC BLOOD PRESSURE: 158 MMHG

## 2018-05-29 LAB
ALBUMIN SERPL-MCNC: 2.5 G/DL (ref 3.4–5)
ALP SERPL-CCNC: 71 U/L (ref 45–117)
ALT SERPL-CCNC: 24 U/L (ref 12–78)
ANION GAP SERPL CALC-SCNC: 5 MMOL/L (ref 5–15)
BASOPHILS # BLD AUTO: 0.03 X10^3/UL (ref 0–0.1)
BASOPHILS NFR BLD AUTO: 0 % (ref 0–1)
BILIRUB SERPL-MCNC: 0.3 MG/DL (ref 0.2–1)
CALCIUM SERPL-MCNC: 7.8 MG/DL (ref 8.5–10.1)
CHLORIDE SERPL-SCNC: 105 MMOL/L (ref 98–107)
CREAT SERPL-MCNC: 2.01 MG/DL (ref 0.55–1.02)
EOSINOPHIL # BLD AUTO: 0.12 X10^3/UL (ref 0–0.4)
EOSINOPHIL NFR BLD AUTO: 2 % (ref 1–7)
ERYTHROCYTE [DISTWIDTH] IN BLOOD BY AUTOMATED COUNT: 13.6 % (ref 9.6–15.2)
LYMPHOCYTES # BLD AUTO: 1.97 X10^3/UL (ref 1–3.4)
LYMPHOCYTES NFR BLD AUTO: 26 % (ref 22–44)
MCH RBC QN AUTO: 29.1 PG (ref 27–34.8)
MCHC RBC AUTO-ENTMCNC: 33 G/DL (ref 32.4–35.8)
MCV RBC AUTO: 88.1 FL (ref 80–100)
MD: NO
MONOCYTES # BLD AUTO: 0.86 X10^3/UL (ref 0.2–0.8)
MONOCYTES NFR BLD AUTO: 11 % (ref 2–9)
NEUTROPHILS # BLD AUTO: 4.68 X10^3/UL (ref 1.8–6.8)
NEUTROPHILS NFR BLD AUTO: 61 % (ref 42–75)
PLATELET # BLD AUTO: 236 X10^3/UL (ref 130–400)
PMV BLD AUTO: 11.5 FL (ref 7.4–10.4)
PROT SERPL-MCNC: 6.3 G/DL (ref 6.4–8.2)
RBC # BLD AUTO: 3.99 X10^6/UL (ref 3.82–5.3)

## 2018-05-29 RX ADMIN — ATORVASTATIN CALCIUM SCH MG: 40 TABLET, FILM COATED ORAL at 21:09

## 2018-05-29 RX ADMIN — METOPROLOL TARTRATE SCH MG: 25 TABLET, FILM COATED ORAL at 09:40

## 2018-05-29 RX ADMIN — POLYETHYLENE GLYCOL 3350 SCH GM: 17 POWDER, FOR SOLUTION ORAL at 09:41

## 2018-05-29 RX ADMIN — INSULIN LISPRO SCH UNITS: 100 INJECTION, SOLUTION INTRAVENOUS; SUBCUTANEOUS at 10:58

## 2018-05-29 RX ADMIN — METOPROLOL TARTRATE SCH MG: 25 TABLET, FILM COATED ORAL at 21:10

## 2018-05-29 RX ADMIN — INSULIN LISPRO SCH UNITS: 100 INJECTION, SOLUTION INTRAVENOUS; SUBCUTANEOUS at 16:00

## 2018-05-29 RX ADMIN — MAGNESIUM OXIDE SCH MG: 400 TABLET ORAL at 21:10

## 2018-05-29 RX ADMIN — INSULIN LISPRO SCH UNITS: 100 INJECTION, SOLUTION INTRAVENOUS; SUBCUTANEOUS at 21:00

## 2018-05-29 RX ADMIN — INSULIN LISPRO SCH UNITS: 100 INJECTION, SOLUTION INTRAVENOUS; SUBCUTANEOUS at 07:00

## 2018-05-29 RX ADMIN — MAGNESIUM OXIDE SCH MG: 400 TABLET ORAL at 09:53

## 2018-05-29 RX ADMIN — INSULIN GLARGINE SCH UNITS: 100 INJECTION, SOLUTION SUBCUTANEOUS at 21:10

## 2018-05-29 RX ADMIN — SODIUM CHLORIDE SCH MLS/HR: 0.9 INJECTION, SOLUTION INTRAVENOUS at 05:32

## 2018-05-30 VITALS — SYSTOLIC BLOOD PRESSURE: 157 MMHG | DIASTOLIC BLOOD PRESSURE: 77 MMHG

## 2018-05-30 VITALS — DIASTOLIC BLOOD PRESSURE: 71 MMHG | SYSTOLIC BLOOD PRESSURE: 166 MMHG

## 2018-05-30 VITALS — SYSTOLIC BLOOD PRESSURE: 136 MMHG | DIASTOLIC BLOOD PRESSURE: 66 MMHG

## 2018-05-30 RX ADMIN — INSULIN LISPRO SCH UNITS: 100 INJECTION, SOLUTION INTRAVENOUS; SUBCUTANEOUS at 11:00

## 2018-05-30 RX ADMIN — INSULIN LISPRO SCH UNITS: 100 INJECTION, SOLUTION INTRAVENOUS; SUBCUTANEOUS at 07:00

## 2018-05-30 RX ADMIN — MAGNESIUM OXIDE SCH MG: 400 TABLET ORAL at 09:35

## 2018-05-30 RX ADMIN — METOPROLOL TARTRATE SCH MG: 25 TABLET, FILM COATED ORAL at 09:35

## 2018-05-30 RX ADMIN — POLYETHYLENE GLYCOL 3350 SCH GM: 17 POWDER, FOR SOLUTION ORAL at 09:35
